# Patient Record
Sex: MALE | Race: WHITE | NOT HISPANIC OR LATINO | Employment: FULL TIME | URBAN - METROPOLITAN AREA
[De-identification: names, ages, dates, MRNs, and addresses within clinical notes are randomized per-mention and may not be internally consistent; named-entity substitution may affect disease eponyms.]

---

## 2019-07-20 ENCOUNTER — HOSPITAL ENCOUNTER (EMERGENCY)
Facility: HOSPITAL | Age: 44
Discharge: HOME | End: 2019-07-20
Attending: EMERGENCY MEDICINE | Admitting: EMERGENCY MEDICINE
Payer: COMMERCIAL

## 2019-07-20 VITALS
WEIGHT: 170 LBS | HEIGHT: 70 IN | OXYGEN SATURATION: 99 % | TEMPERATURE: 98.5 F | RESPIRATION RATE: 20 BRPM | BODY MASS INDEX: 24.34 KG/M2 | DIASTOLIC BLOOD PRESSURE: 95 MMHG | HEART RATE: 127 BPM | SYSTOLIC BLOOD PRESSURE: 169 MMHG

## 2019-07-20 DIAGNOSIS — L02.91 ABSCESS: Primary | ICD-10-CM

## 2019-07-20 PROCEDURE — 87185 SC STD ENZYME DETCJ PER NZM: CPT | Performed by: EMERGENCY MEDICINE

## 2019-07-20 PROCEDURE — 87076 CULTURE ANAEROBE IDENT EACH: CPT | Performed by: EMERGENCY MEDICINE

## 2019-07-20 PROCEDURE — 10061 I&D ABSCESS COMP/MULTIPLE: CPT | Performed by: EMERGENCY MEDICINE

## 2019-07-20 PROCEDURE — 87147 CULTURE TYPE IMMUNOLOGIC: CPT | Performed by: EMERGENCY MEDICINE

## 2019-07-20 PROCEDURE — 87070 CULTURE OTHR SPECIMN AEROBIC: CPT | Performed by: EMERGENCY MEDICINE

## 2019-07-20 PROCEDURE — 87205 SMEAR GRAM STAIN: CPT | Performed by: EMERGENCY MEDICINE

## 2019-07-20 PROCEDURE — 99283 EMERGENCY DEPT VISIT LOW MDM: CPT

## 2019-07-20 PROCEDURE — 99283 EMERGENCY DEPT VISIT LOW MDM: CPT | Performed by: EMERGENCY MEDICINE

## 2019-07-20 RX ORDER — CLINDAMYCIN HYDROCHLORIDE 150 MG/1
150 CAPSULE ORAL EVERY 6 HOURS
Qty: 40 CAPSULE | Refills: 0 | Status: SHIPPED | OUTPATIENT
Start: 2019-07-20 | End: 2019-07-30

## 2019-07-20 RX ORDER — CLINDAMYCIN HYDROCHLORIDE 150 MG/1
150 CAPSULE ORAL EVERY 6 HOURS
Qty: 40 CAPSULE | Refills: 0 | Status: SHIPPED | OUTPATIENT
Start: 2019-07-20 | End: 2019-07-20

## 2019-07-20 RX ORDER — LIDOCAINE HYDROCHLORIDE 10 MG/ML
10 INJECTION, SOLUTION EPIDURAL; INFILTRATION; INTRACAUDAL; PERINEURAL ONCE
Status: COMPLETED | OUTPATIENT
Start: 2019-07-20 | End: 2019-07-20

## 2019-07-20 RX ADMIN — LIDOCAINE HYDROCHLORIDE 10 ML: 10 INJECTION, SOLUTION EPIDURAL; INFILTRATION; INTRACAUDAL; PERINEURAL at 13:22

## 2019-07-20 NOTE — ED NOTES
Moderate amount of blood and white colored purulent drainage from scrotal incision        Kay Estrada RN  07/20/19 7607

## 2019-07-20 NOTE — ED PROVIDER NOTES
History  Chief Complaint   Patient presents with    Cyst     patient presents to the ED with c/o a cyst on his scrotum  patient states it has been there since wednesday and has gotten progressively worse      Patient complains of left scrotal lump gradual worsening over last 3 days without injury  He has had similar lumps that he drained on his own at his [facility] in the past   He denies fever had chills but attributed that to air conditioning  Patient has pain at area of lump  Patient attributes his elevated HR to anxiety  He states that he is just pre-diabetic and controls his diet  None       Past Medical History:   Diagnosis Date    Diabetes mellitus ([facility] [address] )     prediabetic     Psoriasis        History reviewed  No pertinent surgical history  History reviewed  No pertinent family history  I have reviewed and agree with the history as documented  Social History     Tobacco Use    Smoking status: Current Every Day Smoker     Packs/day: 0 50     Types: Cigarettes    Smokeless tobacco: Never Used   Substance Use Topics    Alcohol use: Yes     Comment: rarely     Drug use: Never        Review of Systems   All other systems reviewed and are negative  Physical Exam  Physical Exam   Constitutional: He is oriented to person, place, and time  He appears well-developed and well-nourished  HENT:   Mouth/Throat: Oropharynx is clear and moist    Eyes: Pupils are equal, round, and reactive to light  Conjunctivae and EOM are normal    Neck: Normal range of motion  Neck supple  No spinous process tenderness present  Cardiovascular: Normal rate, regular rhythm, normal heart sounds and intact distal pulses  Pulmonary/Chest: Effort normal and breath sounds normal  No respiratory distress  He has no wheezes  Abdominal: Soft  Bowel sounds are normal  He exhibits no distension  There is no tenderness   Hernia confirmed negative in the right inguinal area and confirmed negative in the left inguinal area  Genitourinary: Penis normal  Left testis shows mass and swelling  Circumcised  No penile tenderness  Genitourinary Comments: Testicles non-tender no edema bilateral but just inferior to left testicle in scrotum is 5 cm fluctuant mobile mass with punctate pustule at surface but no drainage  Small amount of erythema about 3 cm surrounding the pustule  No erythema, mass or tenderness around heather-anal region   Musculoskeletal: Normal range of motion  Neurological: He is alert and oriented to person, place, and time  He has normal strength  No sensory deficit  GCS eye subscore is 4  GCS verbal subscore is 5  GCS motor subscore is 6  Skin: Skin is warm and dry  No rash noted  Psychiatric: He has a normal mood and affect  Nursing note and vitals reviewed  Vital Signs  ED Triage Vitals [07/20/19 1303]   Temperature Pulse Respirations Blood Pressure SpO2   98 5 °F (36 9 °C) (!) 127 20 169/95 99 %      Temp Source Heart Rate Source Patient Position - Orthostatic VS BP Location FiO2 (%)   Temporal Monitor Sitting Left arm --      Pain Score       5           Vitals:    07/20/19 1303   BP: 169/95   Pulse: (!) 127   Patient Position - Orthostatic VS: Sitting         Visual Acuity  Visual Acuity      Most Recent Value   L Pupil Size (mm)  3   R Pupil Size (mm)  3          ED Medications  Medications   lidocaine (PF) (XYLOCAINE-MPF) 1 % injection 10 mL (10 mL Infiltration Given 7/20/19 1322)       Diagnostic Studies  Results Reviewed     Procedure Component Value Units Date/Time    Wound culture and Gram stain [[id number]] Collected:  07/20/19 1427    Lab Status:   In process Specimen:  Wound from Genital Updated:  07/20/19 1449                 No orders to display              Procedures  Incision and drain  Date/Time: 7/20/2019 2:31 PM  Performed by: Effie Smith DO  Authorized by: Effie Smith DO     Patient location:  ED  Consent:     Consent obtained:  Verbal    Consent given by:  Patient Risks discussed:  Bleeding, incomplete drainage, pain and infection    Alternatives discussed:  No treatment  Universal protocol:     Procedure explained and questions answered to patient or proxy's satisfaction: yes      Patient identity confirmed:  Verbally with patient  Location:     Type:  Abscess    Size:  5 cm    Location:  Anogenital    Anogenital location:  Scrotal space  Pre-procedure details:     Skin preparation:  Chloraprep  Anesthesia (see MAR for exact dosages): Anesthesia method:  Local infiltration    Local anesthetic:  Lidocaine 1% w/o epi  Procedure details:     Complexity:  Simple    Incision types:  Stab incision    Scalpel blade:  10    Approach:  Open    Incision depth:  Subcutaneous    Wound management:  Probed and deloculated, irrigated with saline and extensive cleaning    Drainage:  Bloody and purulent    Drainage amount: Moderate    Wound treatment:  Packing placed    Packing materials:  1/2 in gauze    Amount 1/2":  2 different pockets of fluid were lanced and packed, each with 3 cm of packing  Post-procedure details:     Patient tolerance of procedure:   Tolerated well, no immediate complications  Comments:      Bedside ultrasound machine was used to locate fluid collections           ED Course       due to complexity of location and packing, patient was informed to return to ER for reassessment and started on clindamycin                        MDM  Number of Diagnoses or Management Options  Abscess: new and requires workup     Amount and/or Complexity of Data Reviewed  Clinical lab tests: ordered    Patient Progress  Patient progress: improved      Disposition  Final diagnoses:   Abscess - scrotal     Time reflects when diagnosis was documented in both MDM as applicable and the Disposition within this note     Time User Action Codes Description Comment    7/20/2019  2:15 PM Charlette Brar Add [L02 91] Abscess     7/20/2019  2:15 PM Charlette Brar Modify [L02 91] Abscess scrotal      ED Disposition     ED Disposition Condition Date/Time Comment    Discharge Stable Sat Jul 20, 2019  2:15  [address] discharge to home/self care  Follow-up Information     Follow up With Specialties Details Why Contact Info Additional Information    [address] [facility] Emergency Department Emergency Medicine In 2 days  [address]  [address]  [phone] [address] ED, [address], [address]          Discharge Medication List as of 7/20/2019  2:26 PM      START taking these medications    Details   clindamycin (CLEOCIN) 150 mg capsule Take 1 capsule (150 mg total) by mouth every 6 (six) hours for 10 days, Starting Sat 7/20/2019, Until Tue 7/30/2019, Normal           No discharge procedures on file      ED Provider  Electronically Signed by           Olya Peng DO  07/20/19 1934

## 2019-07-20 NOTE — ED NOTES
Second incision and drainage done by Dr Jon Gant above initial incision  Additional blood/purulent drainage, moderate amount of drainage noted  Patient reports relief with additional drainage   Wound cleansed, both incisions packed with one strand     Maria E Minaya RN  07/20/19 9552

## 2019-07-20 NOTE — ED NOTES
Provider and this RN at bedside for incision and drainage of scrotal abscess       Nikhil Domínguez, RN  07/20/19 4029

## 2019-07-23 LAB
BACTERIA WND AEROBE CULT: ABNORMAL
GRAM STN SPEC: ABNORMAL

## 2024-04-25 ENCOUNTER — APPOINTMENT (EMERGENCY)
Dept: ULTRASOUND IMAGING | Facility: HOSPITAL | Age: 49
End: 2024-04-25
Payer: COMMERCIAL

## 2024-04-25 ENCOUNTER — HOSPITAL ENCOUNTER (EMERGENCY)
Facility: HOSPITAL | Age: 49
End: 2024-04-26
Attending: EMERGENCY MEDICINE
Payer: COMMERCIAL

## 2024-04-25 DIAGNOSIS — A41.9 SEVERE SEPSIS (HCC): ICD-10-CM

## 2024-04-25 DIAGNOSIS — N49.2 SCROTAL ABSCESS: Primary | ICD-10-CM

## 2024-04-25 DIAGNOSIS — R65.20 SEVERE SEPSIS (HCC): ICD-10-CM

## 2024-04-25 LAB
ALBUMIN SERPL BCP-MCNC: 4.5 G/DL (ref 3.5–5)
ALP SERPL-CCNC: 116 U/L (ref 34–104)
ALT SERPL W P-5'-P-CCNC: 24 U/L (ref 7–52)
ANION GAP SERPL CALCULATED.3IONS-SCNC: 11 MMOL/L (ref 4–13)
APTT PPP: 24 SECONDS (ref 23–37)
AST SERPL W P-5'-P-CCNC: 12 U/L (ref 13–39)
BASOPHILS # BLD AUTO: 0.03 THOUSANDS/ÂΜL (ref 0–0.1)
BASOPHILS NFR BLD AUTO: 0 % (ref 0–1)
BILIRUB DIRECT SERPL-MCNC: 0.44 MG/DL (ref 0–0.2)
BILIRUB SERPL-MCNC: 2.55 MG/DL (ref 0.2–1)
BUN SERPL-MCNC: 12 MG/DL (ref 5–25)
CALCIUM SERPL-MCNC: 9.9 MG/DL (ref 8.4–10.2)
CHLORIDE SERPL-SCNC: 98 MMOL/L (ref 96–108)
CO2 SERPL-SCNC: 27 MMOL/L (ref 21–32)
CREAT SERPL-MCNC: 0.69 MG/DL (ref 0.6–1.3)
EOSINOPHIL # BLD AUTO: 0.03 THOUSAND/ÂΜL (ref 0–0.61)
EOSINOPHIL NFR BLD AUTO: 0 % (ref 0–6)
ERYTHROCYTE [DISTWIDTH] IN BLOOD BY AUTOMATED COUNT: 11.3 % (ref 11.6–15.1)
GFR SERPL CREATININE-BSD FRML MDRD: 111 ML/MIN/1.73SQ M
GLUCOSE SERPL-MCNC: 171 MG/DL (ref 65–140)
GLUCOSE SERPL-MCNC: 174 MG/DL (ref 65–140)
HCT VFR BLD AUTO: 49.3 % (ref 36.5–49.3)
HGB BLD-MCNC: 16.5 G/DL (ref 12–17)
IMM GRANULOCYTES # BLD AUTO: 0.1 THOUSAND/UL (ref 0–0.2)
IMM GRANULOCYTES NFR BLD AUTO: 1 % (ref 0–2)
INR PPP: 0.98 (ref 0.84–1.19)
LACTATE SERPL-SCNC: 1.2 MMOL/L (ref 0.5–2)
LYMPHOCYTES # BLD AUTO: 1.78 THOUSANDS/ÂΜL (ref 0.6–4.47)
LYMPHOCYTES NFR BLD AUTO: 12 % (ref 14–44)
MCH RBC QN AUTO: 29.2 PG (ref 26.8–34.3)
MCHC RBC AUTO-ENTMCNC: 33.5 G/DL (ref 31.4–37.4)
MCV RBC AUTO: 87 FL (ref 82–98)
MONOCYTES # BLD AUTO: 1.21 THOUSAND/ÂΜL (ref 0.17–1.22)
MONOCYTES NFR BLD AUTO: 8 % (ref 4–12)
NEUTROPHILS # BLD AUTO: 11.5 THOUSANDS/ÂΜL (ref 1.85–7.62)
NEUTS SEG NFR BLD AUTO: 79 % (ref 43–75)
NRBC BLD AUTO-RTO: 0 /100 WBCS
PLATELET # BLD AUTO: 215 THOUSANDS/UL (ref 149–390)
PMV BLD AUTO: 10.1 FL (ref 8.9–12.7)
POTASSIUM SERPL-SCNC: 3.9 MMOL/L (ref 3.5–5.3)
PROCALCITONIN SERPL-MCNC: 0.07 NG/ML
PROT SERPL-MCNC: 8 G/DL (ref 6.4–8.4)
PROTHROMBIN TIME: 13.4 SECONDS (ref 11.6–14.5)
RBC # BLD AUTO: 5.65 MILLION/UL (ref 3.88–5.62)
SODIUM SERPL-SCNC: 136 MMOL/L (ref 135–147)
WBC # BLD AUTO: 14.65 THOUSAND/UL (ref 4.31–10.16)

## 2024-04-25 PROCEDURE — 82948 REAGENT STRIP/BLOOD GLUCOSE: CPT

## 2024-04-25 PROCEDURE — 36415 COLL VENOUS BLD VENIPUNCTURE: CPT | Performed by: EMERGENCY MEDICINE

## 2024-04-25 PROCEDURE — 99285 EMERGENCY DEPT VISIT HI MDM: CPT | Performed by: EMERGENCY MEDICINE

## 2024-04-25 PROCEDURE — 96367 TX/PROPH/DG ADDL SEQ IV INF: CPT

## 2024-04-25 PROCEDURE — 82248 BILIRUBIN DIRECT: CPT | Performed by: EMERGENCY MEDICINE

## 2024-04-25 PROCEDURE — 85610 PROTHROMBIN TIME: CPT | Performed by: EMERGENCY MEDICINE

## 2024-04-25 PROCEDURE — 84145 PROCALCITONIN (PCT): CPT | Performed by: EMERGENCY MEDICINE

## 2024-04-25 PROCEDURE — 96361 HYDRATE IV INFUSION ADD-ON: CPT

## 2024-04-25 PROCEDURE — 85025 COMPLETE CBC W/AUTO DIFF WBC: CPT | Performed by: EMERGENCY MEDICINE

## 2024-04-25 PROCEDURE — 83036 HEMOGLOBIN GLYCOSYLATED A1C: CPT

## 2024-04-25 PROCEDURE — 85730 THROMBOPLASTIN TIME PARTIAL: CPT | Performed by: EMERGENCY MEDICINE

## 2024-04-25 PROCEDURE — 80053 COMPREHEN METABOLIC PANEL: CPT | Performed by: EMERGENCY MEDICINE

## 2024-04-25 PROCEDURE — 99284 EMERGENCY DEPT VISIT MOD MDM: CPT

## 2024-04-25 PROCEDURE — 87040 BLOOD CULTURE FOR BACTERIA: CPT | Performed by: EMERGENCY MEDICINE

## 2024-04-25 PROCEDURE — 93005 ELECTROCARDIOGRAM TRACING: CPT

## 2024-04-25 PROCEDURE — 96365 THER/PROPH/DIAG IV INF INIT: CPT

## 2024-04-25 PROCEDURE — 76870 US EXAM SCROTUM: CPT

## 2024-04-25 PROCEDURE — 83605 ASSAY OF LACTIC ACID: CPT | Performed by: EMERGENCY MEDICINE

## 2024-04-25 RX ORDER — CEFTRIAXONE 2 G/50ML
2000 INJECTION, SOLUTION INTRAVENOUS ONCE
Status: COMPLETED | OUTPATIENT
Start: 2024-04-25 | End: 2024-04-25

## 2024-04-25 RX ORDER — VANCOMYCIN HYDROCHLORIDE 1 G/200ML
15 INJECTION, SOLUTION INTRAVENOUS ONCE
Status: COMPLETED | OUTPATIENT
Start: 2024-04-25 | End: 2024-04-25

## 2024-04-25 RX ORDER — ACETAMINOPHEN 325 MG/1
650 TABLET ORAL ONCE
Status: COMPLETED | OUTPATIENT
Start: 2024-04-25 | End: 2024-04-25

## 2024-04-25 RX ADMIN — SODIUM CHLORIDE 1000 ML: 0.9 INJECTION, SOLUTION INTRAVENOUS at 17:15

## 2024-04-25 RX ADMIN — VANCOMYCIN HYDROCHLORIDE 1000 MG: 1 INJECTION, SOLUTION INTRAVENOUS at 18:05

## 2024-04-25 RX ADMIN — ACETAMINOPHEN 650 MG: 325 TABLET, FILM COATED ORAL at 22:25

## 2024-04-25 RX ADMIN — CEFTRIAXONE 2000 MG: 2 INJECTION, SOLUTION INTRAVENOUS at 17:48

## 2024-04-25 NOTE — SEPSIS NOTE
"  Sepsis Note   Demetri Miller 48 y.o. male MRN: [id number]  Unit/Bed#: ED 10 Encounter: [id number]       Initial Sepsis Screening       Row Name 04/25/24 1758 04/25/24 1744             Is the patient's history suggestive of a new or worsening infection? -- Yes (Proceed)  -JAUN       Suspected source of infection -- soft tissue  -JAUN       Indicate SIRS criteria -- Tachycardia > 90 bpm;Leukocytosis (WBC > 78596 IJL) OR Leukopenia (WBC <4000 IJL) OR Bandemia (WBC >10% bands)  -JUAN       Are two or more of the above signs & symptoms of infection both present and new to the patient? -- Yes (Proceed)  -JUAN       Assess for evidence of organ dysfunction: Are any of the below criteria present within 6 hours of suspected infection and SIRS criteria that are NOT considered to be chronic conditions? Bilirubin > 2.0  -JUAN --       Date of presentation of severe sepsis 04/25/24  -JUAN --       Time of presentation of severe sepsis 1759  -JUAN --       Sepsis Note: Click \"NEXT\" below (NOT \"close\") to generate sepsis note based on above information. -- --                 User Key  (r) = Recorded By, (t) = Taken By, (c) = Cosigned By      Initials Name Provider Type    JUAN Peraza DO Physician                        Body mass index is 22.96 kg/m².  Wt Readings from Last 1 Encounters:   04/25/24 72.6 kg (160 lb)        Ideal body weight: 73 kg (160 lb 15 oz)    "

## 2024-04-25 NOTE — ED PROVIDER NOTES
History  Chief Complaint   Patient presents with    Cyst     Pt said that Monday he got a cyst on his right testicle, pt said that it has gotten worse with redness and swelling. Pt denies pain with urination.      History from patient and medical records, past medical history prediabetic and psoriasis.  Patient has had an abscess or lump at his right scrotal area for about a month now.  It was small he was probably initially trying to treat it with warm soaks in the bathtub but it has gotten larger and more painful over the last several days.  He has associated fevers.  At 1 point patient was able to express purulent discharge but at this point it is not draining.        None       Past Medical History:   Diagnosis Date    Diabetes mellitus (HCC)     prediabetic     Psoriasis        History reviewed. No pertinent surgical history.    History reviewed. No pertinent family history.  I have reviewed and agree with the history as documented.    E-Cigarette/Vaping     E-Cigarette/Vaping Substances     Social History     Tobacco Use    Smoking status: Every Day     Current packs/day: 0.50     Types: Cigarettes    Smokeless tobacco: Never   Substance Use Topics    Alcohol use: Yes     Comment: rarely     Drug use: Never       Review of Systems   Constitutional:  Positive for fever. Negative for chills.   HENT:  Negative for ear pain and sore throat.    Eyes:  Negative for pain and visual disturbance.   Respiratory:  Negative for cough and shortness of breath.    Cardiovascular:  Negative for chest pain and palpitations.   Gastrointestinal:  Negative for abdominal pain and vomiting.   Genitourinary:  Positive for scrotal swelling and testicular pain. Negative for dysuria and hematuria.   Musculoskeletal:  Negative for arthralgias and back pain.   Skin:  Positive for rash and wound. Negative for color change.   Neurological:  Negative for seizures and syncope.   All other systems reviewed and are negative.      Physical  Exam  Physical Exam  Vitals and nursing note reviewed. Exam conducted with a chaperone present.   Constitutional:       General: He is not in acute distress.     Appearance: He is well-developed.   HENT:      Head: Normocephalic and atraumatic.   Eyes:      Conjunctiva/sclera: Conjunctivae normal.   Cardiovascular:      Rate and Rhythm: Regular rhythm. Tachycardia present.      Heart sounds: No murmur heard.  Pulmonary:      Effort: Pulmonary effort is normal. No respiratory distress.      Breath sounds: Normal breath sounds.   Abdominal:      Palpations: Abdomen is soft.      Tenderness: There is no abdominal tenderness.   Genitourinary:     Pubic Area: Rash present.      Penis: Normal and circumcised.       Testes:         Right: Mass, tenderness and swelling present.      Comments: Erythema scaly, rash at inguinal creases consistent with psoriasis.  No crepitus    Right scrotal mass immobile firm about 6 cm oval shape, tender with erythema, appears separate from testicle  Musculoskeletal:         General: No swelling.      Cervical back: Neck supple.   Skin:     General: Skin is warm and dry.      Capillary Refill: Capillary refill takes less than 2 seconds.   Neurological:      Mental Status: He is alert.   Psychiatric:         Mood and Affect: Mood normal.         Vital Signs  ED Triage Vitals   Temperature Pulse Respirations Blood Pressure SpO2   04/25/24 1701 04/25/24 1635 04/25/24 1635 04/25/24 1636 04/25/24 1635   98.1 °F (36.7 °C) (!) 115 18 159/74 98 %      Temp Source Heart Rate Source Patient Position - Orthostatic VS BP Location FiO2 (%)   04/25/24 1701 04/25/24 1800 04/25/24 1800 04/25/24 1800 --   Oral Monitor Sitting Right arm       Pain Score       04/25/24 1930       No Pain           Vitals:    04/25/24 1900 04/25/24 1930 04/25/24 2000 04/25/24 2100   BP: 143/70 153/87 140/79 146/79   Pulse: 99 102 101 94   Patient Position - Orthostatic VS: Sitting Lying Lying          Visual Acuity      ED  Medications  Medications   sodium chloride 0.9 % bolus 1,000 mL (0 mL Intravenous Stopped 4/25/24 1815)   cefTRIAXone (ROCEPHIN) IVPB (premix in dextrose) 2,000 mg 50 mL (0 mg Intravenous Stopped 4/25/24 1818)   vancomycin (VANCOCIN) IVPB (premix in dextrose) 1,000 mg 200 mL (0 mg Intravenous Stopped 4/25/24 1905)       Diagnostic Studies  Results Reviewed       Procedure Component Value Units Date/Time    Blood culture #1 [[id number]] Collected: 04/25/24 1717    Lab Status: Preliminary result Specimen: Blood from Arm, Left Updated: 04/25/24 2101     Blood Culture Received in Microbiology Lab. Culture in Progress.    Blood culture #2 [[id number]] Collected: 04/25/24 1717    Lab Status: Preliminary result Specimen: Blood from Arm, Right Updated: 04/25/24 2101     Blood Culture Received in Microbiology Lab. Culture in Progress.    Bilirubin, direct [[id number]]  (Abnormal) Collected: 04/25/24 1717    Lab Status: Final result Specimen: Blood from Arm, Left Updated: 04/25/24 1803     Bilirubin, Direct 0.44 mg/dL     Procalcitonin [[id number]]  (Normal) Collected: 04/25/24 1717    Lab Status: Final result Specimen: Blood from Arm, Left Updated: 04/25/24 1755     Procalcitonin 0.07 ng/ml     Protime-INR [[id number]]  (Normal) Collected: 04/25/24 1717    Lab Status: Final result Specimen: Blood from Arm, Left Updated: 04/25/24 1747     Protime 13.4 seconds      INR 0.98    APTT [[id number]]  (Normal) Collected: 04/25/24 1717    Lab Status: Final result Specimen: Blood from Arm, Left Updated: 04/25/24 1747     PTT 24 seconds     Comprehensive metabolic panel [[id number]]  (Abnormal) Collected: 04/25/24 1717    Lab Status: Final result Specimen: Blood from Arm, Left Updated: 04/25/24 1744     Sodium 136 mmol/L      Potassium 3.9 mmol/L      Chloride 98 mmol/L      CO2 27 mmol/L      ANION GAP 11 mmol/L      BUN 12 mg/dL      Creatinine 0.69 mg/dL      Glucose 174 mg/dL      Calcium 9.9 mg/dL      AST 12 U/L      ALT 24 U/L       Alkaline Phosphatase 116 U/L      Total Protein 8.0 g/dL      Albumin 4.5 g/dL      Total Bilirubin 2.55 mg/dL      eGFR 111 ml/min/1.73sq m     Narrative:      National Kid[employer] guidelines for Chronic Kidney Disease (CKD):     Stage 1 with normal or high GFR (GFR > 90 mL/min/1.73 square meters)    Stage 2 Mild CKD (GFR = 60-89 mL/min/1.73 square meters)    Stage 3A Moderate CKD (GFR = 45-59 mL/min/1.73 square meters)    Stage 3B Moderate CKD (GFR = 30-44 mL/min/1.73 square meters)    Stage 4 Severe CKD (GFR = 15-29 mL/min/1.73 square meters)    Stage 5 End Stage CKD (GFR <15 mL/min/1.73 square meters)  Note: GFR calculation is accurate only with a steady state creatinine    Lactic acid [[id number]]  (Normal) Collected: 04/25/24 1717    Lab Status: Final result Specimen: Blood from Arm, Left Updated: 04/25/24 1744     LACTIC ACID 1.2 mmol/L     Narrative:      Result may be elevated if tourniquet was used during collection.    CBC and differential [[id number]]  (Abnormal) Collected: 04/25/24 1717    Lab Status: Final result Specimen: Blood from Arm, Left Updated: 04/25/24 1729     WBC 14.65 Thousand/uL      RBC 5.65 Million/uL      Hemoglobin 16.5 g/dL      Hematocrit 49.3 %      MCV 87 fL      MCH 29.2 pg      MCHC 33.5 g/dL      RDW 11.3 %      MPV 10.1 fL      Platelets 215 Thousands/uL      nRBC 0 /100 WBCs      Segmented % 79 %      Immature Grans % 1 %      Lymphocytes % 12 %      Monocytes % 8 %      Eosinophils Relative 0 %      Basophils Relative 0 %      Absolute Neutrophils 11.50 Thousands/µL      Absolute Immature Grans 0.10 Thousand/uL      Absolute Lymphocytes 1.78 Thousands/µL      Absolute Monocytes 1.21 Thousand/µL      Eosinophils Absolute 0.03 Thousand/µL      Basophils Absolute 0.03 Thousands/µL     Fingerstick Glucose (POCT) [[id number]]  (Abnormal) Collected: 04/25/24 1706    Lab Status: Final result Specimen: Blood Updated: 04/25/24 1707     POC Glucose 171 mg/dl                     US scrotum and testicles   Final Result by Tate Zimmer DO (04/25 6953)   Complex collection with peripheral vascularity corresponds to the palpable right side scrotal lump. Differential includes small abscess collection versus unlikely scrotal neoplasm. Reactive wall thickening right hemiscrotum. Urology consultation    recommended.      Both testes appear grossly unremarkable. Slightly increased peak systolic velocities on the right compared to the left testicle difficult to exclude mild right-sided orchitis.      Incidentally noted subcentimeter left epididymal head cyst or spermatocele.      The study was marked in EPIC for immediate notification.      Workstation performed: [id number]                    Procedures  ECG 12 Lead Documentation Only    Date/Time: 4/25/2024 5:56 PM    Performed by: Saulo Peraza DO  Authorized by: Saulo Peraza DO    Indications / Diagnosis:  Tachycardia  ECG reviewed by me, the ED Provider: yes    Patient location:  ED  Previous ECG:     Previous ECG:  Unavailable  Interpretation:     Interpretation: non-specific    Rate:     ECG rate:  112    ECG rate assessment: tachycardic    Rhythm:     Rhythm: sinus tachycardia    Ectopy:     Ectopy: none    QRS:     QRS axis:  Normal    QRS intervals:  Normal  Conduction:     Conduction: abnormal      Abnormal conduction: incomplete RBBB    ST segments:     ST segments:  Normal  T waves:     T waves: normal    Comments:      This EKG was interpreted by me.           ED Course  ED Course as of 04/25/24 2141   Thu Apr 25, 2024 1942 MIKI Camacho NP with urology is reviewing with Dr. Kate     s/o to [facility] - scrotal abscess/ cellulitis with severe sepsis - received IV abx iv fluids, urology in [place] - waiting to hear back to confirm transfer.  NPO for now                       Initial Sepsis Screening       Row Name 04/25/24 1758 04/25/24 5121             Is the patient's history suggestive of a new or worsening infection? -- Yes  "(Proceed)  -JUAN       Suspected source of infection -- soft tissue  -JUAN       Indicate SIRS criteria -- Tachycardia > 90 bpm;Leukocytosis (WBC > 27869 IJL) OR Leukopenia (WBC <4000 IJL) OR Bandemia (WBC >10% bands)  -JUNA       Are two or more of the above signs & symptoms of infection both present and new to the patient? -- Yes (Proceed)  -JUAN       Assess for evidence of organ dysfunction: Are any of the below criteria present within 6 hours of suspected infection and SIRS criteria that are NOT considered to be chronic conditions? Bilirubin > 2.0  -JUAN --       Date of presentation of severe sepsis 04/25/24  -JUAN --       Time of presentation of severe sepsis 1759  -JUAN --       Sepsis Note: Click \"NEXT\" below (NOT \"close\") to generate sepsis note based on above information. -- --                 User Key  (r) = Recorded By, (t) = Taken By, (c) = Cosigned By      Initials Name Provider Type    JUAN Peraza DO Physician                  Default Flowsheet Data (last 720 hours)       Sepsis Reassess       Row Name 04/25/24 1839                   Repeat Volume Status and Tissue Perfusion Assessment Performed    Date of Reassessment: 04/25/24  -JUAN        Time of Reassessment: 1840  -JUAN        Sepsis Reassessment Note: Click \"NEXT\" below (NOT \"close\") to generate sepsis reassessment note. YES (proceed by clicking \"NEXT\")  HR improved, patient has to urinate currently  -JUAN        Repeat Volume Status and Tissue Perfusion Assessment Performed --                  User Key  (r) = Recorded By, (t) = Taken By, (c) = Cosigned By      Initials Name Provider Type    JUAN Peraza DO Physician                                Medical Decision Making  Differential includes right scrotal abscess, consider cellulitis less likely torsion or cyst,.  Patient tachycardic, less likely septic with good BP, no fever in ER.  Patient also anxious.  Other diff will eval for hyperglycemia possibly contributing to wound healing    Amount and/or " Complexity of Data Reviewed  Labs: ordered.  Radiology: ordered.    Risk  Prescription drug management.             Disposition  Final diagnoses:   Scrotal abscess - acute right side with surrounding cellulitis   Severe sepsis (HCC)     Time reflects when diagnosis was documented in both MDM as applicable and the Disposition within this note       Time User Action Codes Description Comment    4/25/2024  6:01 PM Saulo Peraza [N49.2] Scrotal abscess     4/25/2024  6:01 PM Saulo Peraza [N49.2] Scrotal abscess acute right side with surrounding cellulitis    4/25/2024  6:01 PM Saulo Peraza [A41.9,  R65.20] Severe sepsis (HCC)           ED Disposition       ED Disposition   Transfer to Another Facility-In Network    Condition   --    Date/Time   Thu Apr 25, 2024  8:35 PM    Comment   Demetri Miller should be transferred out               Follow-up Information    None         Patient's Medications    No medications on file       No discharge procedures on file.    PDMP Review       None            ED Provider  Electronically Signed by             Saulo Peraza DO  04/25/24 4293

## 2024-04-25 NOTE — SEPSIS NOTE
Sepsis Note   Demetri Miller 48 y.o. male MRN: [id number]  Unit/Bed#: ED 10 Encounter: [id number]       Initial Sepsis Screening       Row Name 04/25/24 1744                Is the patient's history suggestive of a new or worsening infection? Yes (Proceed)  -JUAN        Suspected source of infection soft tissue  -JUAN        Indicate SIRS criteria Tachycardia > 90 bpm;Leukocytosis (WBC > 62015 IJL) OR Leukopenia (WBC <4000 IJL) OR Bandemia (WBC >10% bands)  -JUAN        Are two or more of the above signs & symptoms of infection both present and new to the patient? Yes (Proceed)  -JUAN        Assess for evidence of organ dysfunction: Are any of the below criteria present within 6 hours of suspected infection and SIRS criteria that are NOT considered to be chronic conditions? --                  User Key  (r) = Recorded By, (t) = Taken By, (c) = Cosigned By      Initials Name Provider Type    JUAN Saulo Peraza DO Physician                        Body mass index is 22.96 kg/m².  Wt Readings from Last 1 Encounters:   04/25/24 72.6 kg (160 lb)        Ideal body weight: 73 kg (160 lb 15 oz)

## 2024-04-25 NOTE — TREATMENT PLAN
Received TT from emergency department about patient who presented with a testicular lump x 1 month.  This has been worsening per reports.    WBC 14.65  Procalcitonin and lactic acid normal  Afebrile on arrival  Initially tachycardic at 115, current heart rate 99  Blood pressure stable 143/70  Patient received 2 g of Rocephin as well as vancomycin in the emergency department    Ultrasound    MPRESSION:  Complex collection with peripheral vascularity corresponds to the palpable right side scrotal lump. Differential includes small abscess collection versus unlikely scrotal neoplasm. Reactive wall thickening right hemiscrotum. Urology consultation   recommended.     Both testes appear grossly unremarkable. Slightly increased peak systolic velocities on the right compared to the left testicle difficult to exclude mild right-sided orchitis.     Incidentally noted subcentimeter left epididymal head cyst or spermatocele.       Plan:  Keep n.p.o. for now (last ate food at 10 AM last drank something 4:30 PM)  Fluids and medical optimization per emergency room team  Will discuss with Dr. Kate for further recommendations

## 2024-04-25 NOTE — SEPSIS NOTE
"  Sepsis Note   Demetri Miller 48 y.o. male MRN: [id number]  Unit/Bed#: ED 10 Encounter: [id number]       Initial Sepsis Screening       Row Name 04/25/24 1758 04/25/24 1744             Is the patient's history suggestive of a new or worsening infection? -- Yes (Proceed)  -JUAN       Suspected source of infection -- soft tissue  -JUAN       Indicate SIRS criteria -- Tachycardia > 90 bpm;Leukocytosis (WBC > 93169 IJL) OR Leukopenia (WBC <4000 IJL) OR Bandemia (WBC >10% bands)  -JUAN       Are two or more of the above signs & symptoms of infection both present and new to the patient? -- Yes (Proceed)  -JUAN       Assess for evidence of organ dysfunction: Are any of the below criteria present within 6 hours of suspected infection and SIRS criteria that are NOT considered to be chronic conditions? Bilirubin > 2.0  -JUAN --       Date of presentation of severe sepsis 04/25/24  -JUAN --       Time of presentation of severe sepsis 1759  -JUAN --       Sepsis Note: Click \"NEXT\" below (NOT \"close\") to generate sepsis note based on above information. -- --                 User Key  (r) = Recorded By, (t) = Taken By, (c) = Cosigned By      Initials Name Provider Type    JUAN Peraza DO Physician                    Default Flowsheet Data (last 720 hours)       Sepsis Reassess       Row Name 04/25/24 1839                   Repeat Volume Status and Tissue Perfusion Assessment Performed    Date of Reassessment: 04/25/24  -JUAN        Time of Reassessment: 1840  -JUAN        Sepsis Reassessment Note: Click \"NEXT\" below (NOT \"close\") to generate sepsis reassessment note. YES (proceed by clicking \"NEXT\")  HR improved, patient has to urinate currently  -JUAN        Repeat Volume Status and Tissue Perfusion Assessment Performed --                  User Key  (r) = Recorded By, (t) = Taken By, (c) = Cosigned By      Initials Name Provider Type    JUAN Peraza DO Physician                    Body mass index is 22.96 kg/m².  Wt Readings from Last 1 " Encounters:   04/25/24 72.6 kg (160 lb)        Ideal body weight: 73 kg (160 lb 15 oz)

## 2024-04-26 ENCOUNTER — HOSPITAL ENCOUNTER (INPATIENT)
Facility: HOSPITAL | Age: 49
LOS: 1 days | Discharge: HOME | DRG: 717 | End: 2024-04-27
Attending: UROLOGY | Admitting: INTERNAL MEDICINE
Payer: COMMERCIAL

## 2024-04-26 VITALS
WEIGHT: 160 LBS | SYSTOLIC BLOOD PRESSURE: 149 MMHG | RESPIRATION RATE: 17 BRPM | TEMPERATURE: 98 F | BODY MASS INDEX: 22.96 KG/M2 | OXYGEN SATURATION: 94 % | DIASTOLIC BLOOD PRESSURE: 75 MMHG | HEART RATE: 91 BPM

## 2024-04-26 DIAGNOSIS — N50.89 SCROTAL MASS: Primary | ICD-10-CM

## 2024-04-26 PROBLEM — R73.03 PRE-DIABETES: Status: ACTIVE | Noted: 2024-04-26

## 2024-04-26 PROBLEM — R65.10 SIRS (SYSTEMIC INFLAMMATORY RESPONSE SYNDROME) (HCC): Status: ACTIVE | Noted: 2024-04-26

## 2024-04-26 PROBLEM — L40.9 PSORIASIS: Status: ACTIVE | Noted: 2024-04-26

## 2024-04-26 LAB
GLUCOSE SERPL-MCNC: 119 MG/DL (ref 65–140)
GLUCOSE SERPL-MCNC: 212 MG/DL (ref 65–140)

## 2024-04-26 PROCEDURE — 82948 REAGENT STRIP/BLOOD GLUCOSE: CPT

## 2024-04-26 PROCEDURE — G0379 DIRECT REFER HOSPITAL OBSERV: HCPCS

## 2024-04-26 PROCEDURE — 55100 DRAINAGE OF SCROTUM ABSCESS: CPT | Performed by: UROLOGY

## 2024-04-26 PROCEDURE — 96367 TX/PROPH/DG ADDL SEQ IV INF: CPT

## 2024-04-26 PROCEDURE — 0V950ZZ DRAINAGE OF SCROTUM, OPEN APPROACH: ICD-10-PCS | Performed by: INTERNAL MEDICINE

## 2024-04-26 PROCEDURE — 87147 CULTURE TYPE IMMUNOLOGIC: CPT | Performed by: NURSE PRACTITIONER

## 2024-04-26 PROCEDURE — 87070 CULTURE OTHR SPECIMN AEROBIC: CPT | Performed by: NURSE PRACTITIONER

## 2024-04-26 PROCEDURE — NC001 PR NO CHARGE: Performed by: UROLOGY

## 2024-04-26 PROCEDURE — 87205 SMEAR GRAM STAIN: CPT | Performed by: NURSE PRACTITIONER

## 2024-04-26 PROCEDURE — 99222 1ST HOSP IP/OBS MODERATE 55: CPT | Performed by: UROLOGY

## 2024-04-26 PROCEDURE — 96366 THER/PROPH/DIAG IV INF ADDON: CPT

## 2024-04-26 RX ORDER — ACETAMINOPHEN 325 MG/1
975 TABLET ORAL EVERY 6 HOURS PRN
Status: DISCONTINUED | OUTPATIENT
Start: 2024-04-26 | End: 2024-04-27 | Stop reason: HOSPADM

## 2024-04-26 RX ORDER — CEFTRIAXONE 2 G/50ML
2000 INJECTION, SOLUTION INTRAVENOUS EVERY 24 HOURS
Status: DISCONTINUED | OUTPATIENT
Start: 2024-04-26 | End: 2024-04-26 | Stop reason: HOSPADM

## 2024-04-26 RX ORDER — OXYCODONE HYDROCHLORIDE 5 MG/1
5 TABLET ORAL EVERY 4 HOURS PRN
Status: DISCONTINUED | OUTPATIENT
Start: 2024-04-26 | End: 2024-04-27 | Stop reason: HOSPADM

## 2024-04-26 RX ORDER — LORAZEPAM 2 MG/ML
1 INJECTION INTRAMUSCULAR ONCE
Status: COMPLETED | OUTPATIENT
Start: 2024-04-26 | End: 2024-04-26

## 2024-04-26 RX ORDER — SULFAMETHOXAZOLE AND TRIMETHOPRIM 400; 80 MG/1; MG/1
2 TABLET ORAL EVERY 12 HOURS SCHEDULED
Status: DISCONTINUED | OUTPATIENT
Start: 2024-04-26 | End: 2024-04-27 | Stop reason: HOSPADM

## 2024-04-26 RX ORDER — INSULIN LISPRO 100 [IU]/ML
1-5 INJECTION, SOLUTION INTRAVENOUS; SUBCUTANEOUS
Status: DISCONTINUED | OUTPATIENT
Start: 2024-04-26 | End: 2024-04-27 | Stop reason: HOSPADM

## 2024-04-26 RX ORDER — SODIUM CHLORIDE 9 MG/ML
100 INJECTION, SOLUTION INTRAVENOUS CONTINUOUS
Status: DISCONTINUED | OUTPATIENT
Start: 2024-04-26 | End: 2024-04-26 | Stop reason: HOSPADM

## 2024-04-26 RX ORDER — LIDOCAINE HYDROCHLORIDE 20 MG/ML
10 INJECTION, SOLUTION EPIDURAL; INFILTRATION; INTRACAUDAL; PERINEURAL ONCE
Status: COMPLETED | OUTPATIENT
Start: 2024-04-26 | End: 2024-04-26

## 2024-04-26 RX ORDER — LIDOCAINE 40 MG/G
CREAM TOPICAL ONCE
Qty: 5 G | Refills: 0 | Status: COMPLETED | OUTPATIENT
Start: 2024-04-26 | End: 2024-04-26

## 2024-04-26 RX ADMIN — LIDOCAINE 4%: 4 CREAM TOPICAL at 16:55

## 2024-04-26 RX ADMIN — LORAZEPAM 1 MG: 2 INJECTION INTRAMUSCULAR; INTRAVENOUS at 16:26

## 2024-04-26 RX ADMIN — SODIUM CHLORIDE 100 ML/HR: 0.9 INJECTION, SOLUTION INTRAVENOUS at 11:04

## 2024-04-26 RX ADMIN — VANCOMYCIN HYDROCHLORIDE 1500 MG: 1 INJECTION, POWDER, LYOPHILIZED, FOR SOLUTION INTRAVENOUS at 11:15

## 2024-04-26 RX ADMIN — SULFAMETHOXAZOLE AND TRIMETHOPRIM 2 TABLET: 400; 80 TABLET ORAL at 21:10

## 2024-04-26 RX ADMIN — LIDOCAINE HYDROCHLORIDE 10 ML: 20 INJECTION, SOLUTION EPIDURAL; INFILTRATION; INTRACAUDAL; PERINEURAL at 16:55

## 2024-04-26 RX ADMIN — INSULIN LISPRO 2 UNITS: 100 INJECTION, SOLUTION INTRAVENOUS; SUBCUTANEOUS at 21:15

## 2024-04-26 NOTE — ED CARE HANDOFF
Emergency Department Sign Out Note        Sign out and transfer of care from Dr Peraza. See Separate Emergency Department note.     The patient, Demetri Miller, was evaluated by the previous provider for scrotal abscess.    Workup Completed:  47 yo male who has had an abscess of right scrotal area for about a month now. It was small he was probably initially trying to treat it with warm soaks in the bathtub but it has gotten larger and more painful over the last several days. He has associated fevers. At 1 point patient was able to express purulent discharge but at this point it is not draining.     ED Course / Workup Pending (followup):  Awaiting call from urology to approve likely transfer.  Did not want pain meds, got Ceftriaxone and vanco.    2142 - I isaac texted the on call APC Mayda Palomo who will check on plan and get back to me.     Pt being transferred to [facility] - spoke with [facility] who accepts pt to Dr Castaneda service.    Final diagnoses:   Scrotal abscess - acute right side with surrounding cellulitis   Severe sepsis (HCC)                               ED Course as of 04/26/24 0257   Thu Apr 25, 2024 2212 Dr Castaneda accepts pt for transfer.  Patient is aware of being excepted as transfer and urology will be seeing him at [facility] likely tomorrow.  I did discuss with him pain control and he would like a dose of Toradol.  He needs to be n.p.o. after midnight.     Procedures  Medical Decision Making  Amount and/or Complexity of Data Reviewed  Labs: ordered.  Radiology: ordered.    Risk  OTC drugs.  Prescription drug management.            Disposition  Final diagnoses:   Scrotal abscess - acute right side with surrounding cellulitis   Severe sepsis (HCC)     Time reflects when diagnosis was documented in both MDM as applicable and the Disposition within this note       Time User Action Codes Description Comment    4/25/2024  6:01 PM Saulo Peraza Add [N49.2] Scrotal abscess     4/25/2024  6:01  PM Saulo Peraza Modify [N49.2] Scrotal abscess acute right side with surrounding cellulitis    4/25/2024  6:01 PM Saulo Peraza Add [A41.9,  R65.20] Severe sepsis (HCC)           ED Disposition       ED Disposition   Transfer to Another Facility-In Network    Condition   --    Date/Time   Thu Apr 25, 2024  8:35 PM    Comment   Demetri SUMMERS Bridannbiju should be transferred out               MD Documentation      Flowsheet Row Most Recent Value   Patient Condition The patient has been stabilized such that within reasonable medical probability, no material deterioration of the patient condition or the condition of the unborn child(khushi) is likely to result from the transfer   Reason for Transfer Level of Care needed not available at this facility   Benefits of Transfer Specialized equipment and/or services available at the receiving facility (Include comment)________________________  [urology - OR if needed]   Risks of Transfer Potential deterioration of medical condition   Accepting Physician Tonya   Accepting Facility Name, [facility]    (Name & Tel number) Kellie Gómez   Provider Certification General risk, such as traffic hazards, adverse weather conditions, rough terrain or turbulence, possible failure of equipment (including vehicle or aircraft), or consequences of actions of persons outside the control of the transport personnel          RN Documentation      Flowsheet Row Most Recent Value   Accepting Facility Name, [facility]    (Name & Tel number) Kellie   Level of Care Basic life support          Follow-up Information    None       Patient's Medications    No medications on file     No discharge procedures on file.       ED Provider  Electronically Signed by     Adri Gómez DO  04/26/24 0258

## 2024-04-26 NOTE — ED NOTES
number for report [phone], spoke with Rosalia, no further questions      Mariely Landis, BOBY  04/26/24 7813

## 2024-04-26 NOTE — ED CARE HANDOFF
Emergency Department Sign Out Note        Sign out and transfer of care from Dr. Gómez. See Separate Emergency Department note.     The patient, Demetri Miller, was evaluated by the previous provider for scrotal infection.                                    ED Course as of 04/26/24 1314   Fri Apr 26, 2024   1108 Patient pending transfer to OR for scrotal infection and has been NPO since midnight. I started NS infusion and maintenance and spoke with pharmacy in order to appropriately order his next antibiotics doses due to uncertainty around transfer time.   1312 Spoke with [facility]. Patient has a pending bed at this time awaiting for DC to be completed. As such, discussed with hospitalist, Dr. Mcclure and will hold on a consult since plan is for transfer sometime this afternoon.      Procedures  Medical Decision Making  Amount and/or Complexity of Data Reviewed  Labs: ordered.  Radiology: ordered.    Risk  OTC drugs.  Prescription drug management.            Disposition  Final diagnoses:   Scrotal abscess - acute right side with surrounding cellulitis   Severe sepsis (HCC)     Time reflects when diagnosis was documented in both MDM as applicable and the Disposition within this note       Time User Action Codes Description Comment    4/25/2024  6:01 PM Saulo Peraza Add [N49.2] Scrotal abscess     4/25/2024  6:01 PM Saulo Peraza Modify [N49.2] Scrotal abscess acute right side with surrounding cellulitis    4/25/2024  6:01 PM Saulo Peraza [A41.9,  R65.20] Severe sepsis (HCC)           ED Disposition       ED Disposition   Transfer to Another Facility-In Network    Condition   --    Date/Time   Thu Apr 25, 2024  8:35 PM    Comment   Demetri Miller should be transferred out               MD Documentation      Flowsheet Row Most Recent Value   Patient Condition The patient has been stabilized such that within reasonable medical probability, no material deterioration of the patient condition or the condition of the  unborn child(khushi) is likely to result from the transfer   Reason for Transfer Level of Care needed not available at this facility   Benefits of Transfer Specialized equipment and/or services available at the receiving facility (Include comment)________________________  [urology - OR if needed]   Risks of Transfer Potential deterioration of medical condition   Accepting Physician Tonya   Accepting Facility Name, [facility]    (Name & Tel number) Kellie   Darrick Gómez   Provider Certification General risk, such as traffic hazards, adverse weather conditions, rough terrain or turbulence, possible failure of equipment (including vehicle or aircraft), or consequences of actions of persons outside the control of the transport personnel          RN Documentation      Flowsheet Row Most Recent Value   Accepting Facility Name, [facility]    (Name & Tel number) Kellie   Level of Care Basic life support          Follow-up Information    None       Patient's Medications    No medications on file     No discharge procedures on file.       ED Provider  Electronically Signed by     Shena White DO  04/26/24 1218

## 2024-04-26 NOTE — ASSESSMENT & PLAN NOTE
Per patient does have history of prediabetes    SSI while inpatient.  Pending A1C  Follow up with PCP.

## 2024-04-26 NOTE — CONSULTS
ASSESSMENT:     48 y.o. male  with right dependent scrotal abscess    PLAN:     The patient has a very clear clinical abscess on exam with fluctuance at the dependent portion of the right hemiscrotum.  We discussed the options of operative I&D or an I&D at the bedside.  Given some of the current OR constraints, it is unclear when the patient will be able to proceed to the operating room and frankly he prefers to avoid anesthetic as this creates some anxiety for him.  He be very willing to again undergo a bedside incision and drainage with appropriate sedation and numbing.  Procedure was detailed for the patient and his sister including the need for packing after drainage.  Risks of bleeding and infection were reviewed.  After discussion, the patient agrees to proceed and signed informed consent.        ----          UROLOGY NEW CONSULT NOTE     CHIEF COMPLAINT   Demetri Miller is a 48 y.o. male with a complaint of No chief complaint on file.      History of Present Illness:   Demetri Miller is a 48 y.o. male transferred to [place] from the [facility] with concern for right scrotal swelling and pain.  Patient has a history of a contralateral left-sided abscess that was lanced at the bedside during a prior hospitalization.  Patient has been dealing with pain and discomfort since Monday but this began to [facility].  Was seen in the emergency room last evening and ultrasound performed.    Past Medical History:     Past Medical History:   Diagnosis Date    Diabetes mellitus (HCC)     prediabetic     Psoriasis        PAST SURGICAL HISTORY:   No past surgical history on file.    CURRENT MEDICATIONS:     Current Facility-Administered Medications   Medication Dose Route Frequency Provider Last Rate Last Admin    lidocaine (PF) (XYLOCAINE-MPF) 2 % injection 10 mL  10 mL Infiltration Once RENITA Mazariegos        LORazepam (ATIVAN) injection 1 mg  1 mg Intravenous Once RENITA Mazariegos            ALLERGIES:     Allergies   Allergen Reactions    Aspirin Anaphylaxis    Penicillins Anaphylaxis       SOCIAL HISTORY:     Social History     Socioeconomic History    Marital status:      Spouse name: Not on file    Number of children: Not on file    Years of education: Not on file    Highest education level: Not on file   Occupational History    Not on file   Tobacco Use    Smoking status: Every Day     Current packs/day: 0.50     Types: Cigarettes    Smokeless tobacco: Never   Substance and Sexual Activity    Alcohol use: Yes     Comment: rarely     Drug use: Never    Sexual activity: Not on file   Other Topics Concern    Not on file   Social History Narrative    Lives at home with family    Working full time     Social Determinants of Health     Financial Resource Strain: Not on file   Food Insecurity: Not on file   Transportation Needs: Not on file   Physical Activity: Not on file   Stress: Not on file   Social Connections: Not on file   Intimate Partner Violence: Not on file   Housing Stability: Not on file       SOCIAL HISTORY:   No family history on file.    REVIEW OF SYSTEMS:     Review of Systems   Constitutional:  Negative for chills and fever.   HENT:  Negative for ear pain and sore throat.    Eyes:  Negative for pain and visual disturbance.   Respiratory:  Negative for cough and shortness of breath.    Cardiovascular:  Negative for chest pain and palpitations.   Gastrointestinal:  Negative for abdominal pain and vomiting.   Genitourinary:  Positive for scrotal swelling. Negative for dysuria and hematuria.   Musculoskeletal:  Negative for arthralgias and back pain.   Skin:  Negative for color change and rash.   Neurological:  Negative for seizures and syncope.   All other systems reviewed and are negative.        PHYSICAL EXAM:     /91   Pulse (!) 107   Temp 98.6 °F (37 °C)   Resp 18   SpO2 97%     Physical Exam  Vitals reviewed.   Constitutional:       General: He is not in acute  distress.     Appearance: He is well-developed.   HENT:      Head: Normocephalic and atraumatic.   Eyes:      Pupils: Pupils are equal, round, and reactive to light.   Cardiovascular:      Rate and Rhythm: Normal rate.   Pulmonary:      Effort: Pulmonary effort is normal. No respiratory distress.      Breath sounds: Normal breath sounds.   Abdominal:      General: There is no distension.      Palpations: Abdomen is soft.      Tenderness: There is no abdominal tenderness.   Genitourinary:     Penis: Normal.       Comments: Some dependent swelling in the right hemiscrotum  Musculoskeletal:         General: Normal range of motion.      Cervical back: Normal range of motion and neck supple.   Skin:     General: Skin is warm and dry.   Neurological:      Mental Status: He is alert and oriented to person, place, and time.   Psychiatric:         Behavior: Behavior normal.         LABS:     CBC:   Lab Results   Component Value Date    WBC 14.65 (H) 04/25/2024    HGB 16.5 04/25/2024    HCT 49.3 04/25/2024    MCV 87 04/25/2024     04/25/2024       BMP:   Lab Results   Component Value Date    CALCIUM 9.9 04/25/2024    K 3.9 04/25/2024    CO2 27 04/25/2024    CL 98 04/25/2024    BUN 12 04/25/2024    CREATININE 0.69 04/25/2024         IMAGING:     Narrative & Impression   SCROTAL ULTRASOUND     INDICATION: right side swelling.     COMPARISON: None     TECHNIQUE: Ultrasound the scrotal contents was performed with a high frequency linear transducer utilizing volumetric sweep imaging as well as standard still image techniques. Imaging performed in longitudinal and transverse orientation. Color and   spectral Doppler evaluation also performed bilaterally.     FINDINGS:     TESTES:  Testes are symmetric and normal in size.     RIGHT testis = 4.3 x 2.2 x 2.1 cm. Volume 10.5 mL  Normal contour with homogeneous smooth echotexture.  No intratesticular mass lesion or calcifications.     LEFT testis = 4.0 x 2.0 x 2.4 cm.  Normal  contour with homogeneous smooth echotexture.  No intratesticular mass lesion or calcifications.     Normal Doppler flow within both testes is present. Peak systolic velocities in the right testicle up to 6 cm/s. Peak systolic velocities in the left testicle up to 5 cm/s.     EPIDIDYMIDES:  Right epididymal head measures 1.4 x 0.8 x 0.9 cm. Heterogeneous mass caudal to the right testicle in the area of palpable lump measuring 2.6 x 2.6 x 2.5 cm with increased peripheral vascularity.     Left-sided epididymal head measures 1.2 x 0.8 x 1.1 cm. Left epididymal head cyst or spermatocele measuring 7 mm.  Doppler ultrasound demonstrates normal blood flow.  No epididymal lesions.     HYDROCELE: No significant fluid present.     VARICOCELE: None present.     SCROTUM: Right scrotal wall thickness increased up to 7 mm.  Left-sided scrotal wall thickness normal at 2-3 mm.  No evidence for  hernia demonstrated.        IMPRESSION:  Complex collection with peripheral vascularity corresponds to the palpable right side scrotal lump. Differential includes small abscess collection versus unlikely scrotal neoplasm. Reactive wall thickening right hemiscrotum. Urology consultation   recommended.     Both testes appear grossly unremarkable. Slightly increased peak systolic velocities on the right compared to the left testicle difficult to exclude mild right-sided orchitis.     Incidentally noted subcentimeter left epididymal head cyst or spermatocele.

## 2024-04-26 NOTE — PROGRESS NOTES
Demetri Miller is a 48 y.o. male who is currently ordered Vancomycin IV with management by the Pharmacy Consult service.  Relevant clinical data and objective / subjective history reviewed.  Vancomycin Assessment:  Indication and Goal AUC/Trough: Other, Scrotal inection, -600, trough >10  Clinical Status: stable  Micro:     Renal Function:  SCr: 0.69 mg/dL  CrCl: 133 mL/min  Renal replacement: Not on dialysis  Days of Therapy: 2  Current Dose: Vancomycin 1000mg IV x1 given ED 4/25/24 1800  Vancomycin Plan:  New Dosing: Vancomycin 1500mg IV Q12H  Estimated AUC: 486 mcg*hr/mL  Estimated Trough: 10.8 mcg/mL  Next Level: Random Level AM labs 4/27/24  Renal Function Monitoring: Daily BMP and UOP  Pharmacy will continue to follow closely for s/sx of nephrotoxicity, infusion reactions and appropriateness of therapy.  BMP and CBC will be ordered per protocol. We will continue to follow the patient’s culture results and clinical progress daily.    Meir Torres, Pharmacist

## 2024-04-26 NOTE — ASSESSMENT & PLAN NOTE
Patient did meet SIRS criteria upon arrival being tachycardic and having elevated leukocytosis.  Lactic 1.2.  Pro-Colt 0.07.  Blood cultures drawn x 2 and pending  -Ultrasound did show findings that could be consistent with abscess    See A&P for scrotal mass.  Current stable vitals, feels well, appropriate for discharge.

## 2024-04-26 NOTE — H&P
"      INTERNAL MEDICINE RESIDENCY ADMISSION H&P     Name: Demetri Miller   Age & Sex: 48 y.o. male   MRN: [id number]  Unit/Bed#: [facility] 919-01   Encounter: [id number]  Primary Care Provider: No primary care provider on file.    Code Status: Level 1 - Full Code  Admission Status: INPATIENT   Disposition: Patient requires Med/Surg    Admit to team: SOD Team B     ASSESSMENT/PLAN     Principal Problem:    Scrotal mass  Active Problems:    SIRS (systemic inflammatory response syndrome) ([facility])    Pre-diabetes    Psoriasis      * Scrotal mass  Assessment & Plan  Notes cyst on right testicle since Monday that has gotten more erythematous and painful which prompted ED evaluation  -US in ED-\"Complex collection with peripheral vascularity corresponds to the palpable right side scrotal lump. Differential includes small abscess collection versus unlikely scrotal neoplasm. Reactive wall thickening right hemiscrotum. \" Pt transferred to hospitals, urology did bedside I&D and noted foul smelling purulent material    Urology consulted   Pt post bedside I&D  Cultures sent  Antibiotics per nephro: bactrim and follow up cultures   Pain regimen in place     SIRS (systemic inflammatory response syndrome) ([facility])  Assessment & Plan  Patient did meet SIRS criteria upon arrival being tachycardic and having elevated leukocytosis.  Lactic 1.2.  Pro-Colt 0.07.  Blood cultures drawn x 2 and pending  -Ultrasound did show findings that could be consistent with abscess    Will discuss with urology about antibiotics on presentation but for we will continue continue ceftriaxone 2 g every 24 hours  Redraw blood cultures x 2  Continue to monitor for signs of worsening infection      Psoriasis  Assessment & Plan  Known history of psoriasis.  Not currently on any treatment.    Pre-diabetes  Assessment & Plan  Per patient does have history of prediabetes    A1c  SSI  Carb controlled diet when not n.p.o.        VTE Pharmacologic Prophylaxis: Reason for no " "pharmacologic prophylaxis no indication  VTE Mechanical Prophylaxis: sequential compression device    CHIEF COMPLAINT   No chief complaint on file.     HISTORY OF PRESENT ILLNESS     Patient is a 48-year-old with only known past medical history of diabetes but does not actively see physicians.  Patient is presenting due to a cyst that he found on his right testicle on Monday that has progressively been getting more erythematous and more painful.  Patient presented to [facility] and ultrasound was done showing potential abscess versus unlikely neoplasm.  Recommendation was to be sent to [facility] for further urology evaluation.  At  UB patient presented afebrile, pulse 115, /74, 98% on room air.  Significant lab findings included WBC 14.65, creatinine 0.69, direct bilirubin 2.55 with repeat being 0.44, lactic 1.2, Pro-Colt 0.07, blood cultures x 2 pending.    Patient seen and examined today and states he still does have some discomfort in his scrotum.  No other complaints at this time.  REVIEW OF SYSTEMS     Review of Systems   Constitutional:  Negative for fatigue.   HENT: Negative.     Respiratory:  Negative for shortness of breath.    Cardiovascular:  Negative for chest pain, palpitations and leg swelling.   Gastrointestinal: Negative.    Genitourinary:  Positive for scrotal swelling and testicular pain.   Musculoskeletal: Negative.    Neurological: Negative.    Psychiatric/Behavioral: Negative.       OBJECTIVE     Vitals:    24 1523   BP: 149/91   Pulse: (!) 107   Resp: 18   Temp: 98.6 °F (37 °C)   TempSrc: Oral   SpO2: 97%   Weight: 72.8 kg (160 lb 7.9 oz)   Height: 5' 10\" (1.778 m)      Temperature:   Temp (24hrs), Av.3 °F (36.8 °C), Min:98 °F (36.7 °C), Max:98.6 °F (37 °C)    Temperature: 98.6 °F (37 °C)  Intake & Output:  I/O       None          Weights:   IBW (Ideal Body Weight): 73 kg    Body mass index is 23.03 kg/m².  Weight (last 2 days)       Date/Time Weight    " 04/26/24 15:23:08 72.8 (160.5)          Physical Exam  Vitals reviewed.   Constitutional:       Appearance: Normal appearance.   HENT:      Head: Normocephalic and atraumatic.      Mouth/Throat:      Mouth: Mucous membranes are moist.      Pharynx: Oropharynx is clear.   Eyes:      Pupils: Pupils are equal, round, and reactive to light.   Cardiovascular:      Rate and Rhythm: Normal rate and regular rhythm.      Pulses: Normal pulses.      Heart sounds: Normal heart sounds. No murmur heard.  Pulmonary:      Effort: Pulmonary effort is normal. No respiratory distress.      Breath sounds: Normal breath sounds.   Abdominal:      General: Abdomen is flat. Bowel sounds are normal.   Genitourinary:     Comments: Genital area covered with wound dressings post incision and drainage   Musculoskeletal:      Right lower leg: No edema.      Left lower leg: No edema.   Skin:     General: Skin is warm and dry.   Neurological:      Mental Status: He is alert and oriented to person, place, and time.   Psychiatric:         Mood and Affect: Mood normal.         Behavior: Behavior normal.       PAST MEDICAL HISTORY     Past Medical History:   Diagnosis Date    Diabetes mellitus (HCC)     prediabetic     Psoriasis      PAST SURGICAL HISTORY     Past Surgical History:   Procedure Laterality Date    INCISION AND DRAINAGE  4/26/2024     SOCIAL & FAMILY HISTORY     Social History     Substance and Sexual Activity   Alcohol Use Yes    Comment: rarely        Social History     Substance and Sexual Activity   Drug Use Never     Social History     Tobacco Use   Smoking Status Every Day    Current packs/day: 0.50    Types: Cigarettes   Smokeless Tobacco Never     History reviewed. No pertinent family history.  LABORATORY DATA     Labs: I have personally reviewed pertinent reports.    Results from last 7 days   Lab Units 04/25/24  1717   WBC Thousand/uL 14.65*   HEMOGLOBIN g/dL 16.5   HEMATOCRIT % 49.3   PLATELETS Thousands/uL 215   SEGS PCT %  79*   MONO PCT % 8   EOS PCT % 0      Results from last 7 days   Lab Units 04/25/24  1717   POTASSIUM mmol/L 3.9   CHLORIDE mmol/L 98   CO2 mmol/L 27   BUN mg/dL 12   CREATININE mg/dL 0.69   CALCIUM mg/dL 9.9   ALK PHOS U/L 116*   ALT U/L 24   AST U/L 12*              Results from last 7 days   Lab Units 04/25/24  1717   INR  0.98   PTT seconds 24     Results from last 7 days   Lab Units 04/25/24  1717   LACTIC ACID mmol/L 1.2         Micro:  Lab Results   Component Value Date    BLOODCX Received in Microbiology Lab. Culture in Progress. 04/25/2024    BLOODCX Received in Microbiology Lab. Culture in Progress. 04/25/2024    WOUNDCULT 2+ Growth of Beta Hemolytic Streptococcus Group B (A) 07/20/2019    WOUNDCULT Growth in Broth culture only Prevotella bivia (A) 07/20/2019    WOUNDCULT Few Colonies of 07/20/2019     IMAGING & DIAGNOSTIC TESTS     Imaging: I have personally reviewed pertinent reports.    US scrotum and testicles    Result Date: 4/25/2024  Impression: Complex collection with peripheral vascularity corresponds to the palpable right side scrotal lump. Differential includes small abscess collection versus unlikely scrotal neoplasm. Reactive wall thickening right hemiscrotum. Urology consultation recommended. Both testes appear grossly unremarkable. Slightly increased peak systolic velocities on the right compared to the left testicle difficult to exclude mild right-sided orchitis. Incidentally noted subcentimeter left epididymal head cyst or spermatocele. The study was marked in EPIC for immediate notification. Workstation performed: [id number]     EKG, Pathology, and Other Studies: I have personally reviewed pertinent reports.     ALLERGIES     Allergies   Allergen Reactions    Aspirin Anaphylaxis    Penicillins Anaphylaxis     MEDICATIONS PRIOR TO ARRIVAL     Prior to Admission medications    Not on File     MEDICATIONS ADMINISTERED IN LAST 24 HOURS     Medication Administration - last 24 hours from  "04/25/2024 1814 to 04/26/2024 1814         Date/Time Order Dose Route Action Action by     04/26/2024 1655 EDT lidocaine (PF) (XYLOCAINE-MPF) 2 % injection 10 mL 10 mL Infiltration Given Rosalia Marques RN     04/26/2024 1626 EDT LORazepam (ATIVAN) injection 1 mg 1 mg Intravenous Given Rosalia Marques RN     04/26/2024 1655 EDT lidocaine (LMX) 4 % cream -- Topical Given Rosalia Marques RN     04/26/2024 1720 EDT insulin lispro (HumALOG/ADMELOG) 100 units/mL subcutaneous injection 1-5 Units -- Subcutaneous Not Given Rosalia Marques RN          CURRENT MEDICATIONS     Current Facility-Administered Medications   Medication Dose Route Frequency Provider Last Rate    acetaminophen  975 mg Oral Q6H PRN Arias Norris MD      insulin lispro  1-5 Units Subcutaneous 4x Daily (AC & HS) Geovani Xiong MD      oxyCODONE  5 mg Oral Q4H PRN Arias Norris MD      oxyCODONE  2.5 mg Oral Q4H PRN Arias Norris MD      sulfamethoxazole-trimethoprim  2 tablet Oral Q12H AUSTYN Arias Norris MD            acetaminophen, 975 mg, Q6H PRN  oxyCODONE, 5 mg, Q4H PRN  oxyCODONE, 2.5 mg, Q4H PRN        Admission Time  I spent 30 minutes admitting the patient.  This involved direct patient contact where I performed a full history and physical, reviewing previous records, and reviewing laboratory and other diagnostic studies.    Portions of the record may have been created with voice recognition software.  Occasional wrong word or \"sound a like\" substitutions may have occurred due to the inherent limitations of voice recognition software.  Read the chart carefully and recognize, using context, where substitutions have occurred.    ==  Arias Norris MD  [facility]  Internal Medicine Residency PGY-2   "

## 2024-04-26 NOTE — QUICK NOTE
Asked to re-examine patient at bedside. Awaiting transfer to [facility] for right testicular abscess. No beds available.     Pt reports ongoing pain with movement. He also reports he had a similar episode 5-6 years ago.     VSS, afeb, no new labs    Exam: mild scaly erythema at groin creases due to psoriasis.   Right testicle is moderately swollen and tender. Small amount of ecchymosis noted at the dependent portion of the testicle. No skin breakdown noted.     Will report back to Urology team with findings.

## 2024-04-26 NOTE — ASSESSMENT & PLAN NOTE
"Notes cyst on right testicle since Monday that has gotten more erythematous and painful which prompted ED evaluation  -US in ED-\"Complex collection with peripheral vascularity corresponds to the palpable right side scrotal lump. Differential includes small abscess collection versus unlikely scrotal neoplasm. Reactive wall thickening right hemiscrotum. \" Pt transferred to [facility], urology did bedside I&D and noted foul smelling purulent material    Urology consulted   Pt post bedside I&D 4//26.  Doing well post procedure, no complaints this morning.  Wound culture noted for 3+ disintegrating polys, 2+ GPC, 2+ gram variable rods. Blood cultures negative at 24 hours.  On Bactrim DS 2 tablets Q12, day 2/7. Plan to discharge to complete regimen outpatient.  Will follow up with urology in 1 week for wound check.  Dressing changes twice daily, patient instructed.  Pain regimen in place   "

## 2024-04-26 NOTE — PROCEDURES
The patient was consented for the procedure.  After consent was signed, the patient was administered 1 mg of intravenous Ativan for local sedation.  4% LMX cream was placed over the scrotal skin and the right dependent scrotum and secured with a Tegaderm.    After approximately 5 minutes, the Tegaderm was removed and the LMX cream was removed from the skin.  The area was prepped with Betadine.  2% lidocaine was injected around the circumferential fluctuant area.  A #11 blade was then used to make a 2 cm longitudinal incision.  We immediately encountered foul-smelling purulence.  Culture that was obtained.    Approximately 50 to 100 cc of brown purulence was expressed.  The area was probed with a Q-tip.  Irrigated with sterile saline.  The remainder of the 10 cc of 2% lidocaine plain was instilled around and in the area.    Noniodoform packing was placed into the abscess cavity.  Gauze and an ABD pad were placed over top along with mesh panties.    Patient felt relief after the abscess was drained and tolerated the procedure well.    Plan will be for packing exchange in the next 24 hours

## 2024-04-26 NOTE — ED NOTES
1415 with SIMONA,  [facility] 919  Dr. Delgadillo accepting  number for report [phone]     Amanda Michaud RN  04/26/24 8927

## 2024-04-26 NOTE — EMTALA/ACUTE CARE TRANSFER
[facility] EMERGENCY DEPARTMENT  [address]  [facility] [address]  Dept: [phone]      EMTALA TRANSFER CONSENT    NAME Demetri Miller                                         1975                              MRN [id number]    I have been informed of my rights regarding examination, treatment, and transfer   by Dr. Adri Gómez DO    Benefits: Specialized equipment and/or services available at the receiving facility (Include comment)________________________ (urology - OR if needed)    Risks: Potential deterioration of medical condition      Consent for Transfer:  I acknowledge that my medical condition has been evaluated and explained to me by the emergency department physician or other qualified medical person and/or my attending physician, who has recommended that I be transferred to the service of  Accepting Physician: Tonya at Accepting Facility Name, City & State : [facility]. The above potential benefits of such transfer, the potential risks associated with such transfer, and the probable risks of not being transferred have been explained to me, and I fully understand them.  The doctor has explained that, in my case, the benefits of transfer outweigh the risks.  I agree to be transferred.    I authorize the performance of emergency medical procedures and treatments upon me in both transit and upon arrival at the receiving facility.  Additionally, I authorize the release of any and all medical records to the receiving facility and request they be transported with me, if possible.  I understand that the safest mode of transportation during a medical emergency is an ambulance and that the Hospital advocates the use of this mode of transport. Risks of traveling to the receiving facility by car, including absence of medical control, life sustaining equipment, such as oxygen, and medical personnel has been explained to me and I fully understand them.    (STEVE  CORRECT BOX BELOW)  [ x ]  I consent to the stated transfer and to be transported by ambulance/helicopter.  [  ]  I consent to the stated transfer, but refuse transportation by ambulance and accept full responsibility for my transportation by car.  I understand the risks of non-ambulance transfers and I exonerate the Hospital and its staff from any deterioration in my condition that results from this refusal.    X___________________________________________    DATE  24  TIME________  Signature of patient or legally responsible individual signing on patient behalf           RELATIONSHIP TO PATIENT_________________________          Provider Certification    NAME Demetri Miller                                         1975                              MRN [id number]    A medical screening exam was performed on the above named patient.  Based on the examination:    Condition Necessitating Transfer The primary encounter diagnosis was Scrotal abscess. A diagnosis of Severe sepsis (HCC) was also pertinent to this visit.    Patient Condition: The patient has been stabilized such that within reasonable medical probability, no material deterioration of the patient condition or the condition of the unborn child(khushi) is likely to result from the transfer    Reason for Transfer: Level of Care needed not available at this facility    Transfer Requirements: Facility [facility]   Space available and qualified personnel available for treatment as acknowledged by Kellie Hayden to accept transfer and to provide appropriate medical treatment as acknowledged by       Tonya  Appropriate medical records of the examination and treatment of the patient are provided at the time of transfer   STAFF INITIAL WHEN COMPLETED _______  Transfer will be performed by qualified personnel from    and appropriate transfer equipment as required, including the use of necessary and appropriate life support measures.    Provider Certification: I  have examined the patient and explained the following risks and benefits of being transferred/refusing transfer to the patient/family:  General risk, such as traffic hazards, adverse weather conditions, rough terrain or turbulence, possible failure of equipment (including vehicle or aircraft), or consequences of actions of persons outside the control of the transport personnel      Based on these reasonable risks and benefits to the patient and/or the unborn child(khushi), and based upon the information available at the time of the patient’s examination, I certify that the medical benefits reasonably to be expected from the provision of appropriate medical treatments at another medical facility outweigh the increasing risks, if any, to the individual’s medical condition, and in the case of labor to the unborn child, from effecting the transfer.    X____________________________________________ DATE 04/25/24        TIME_______      ORIGINAL - SEND TO MEDICAL RECORDS   COPY - SEND WITH PATIENT DURING TRANSFER

## 2024-04-27 VITALS
RESPIRATION RATE: 15 BRPM | HEIGHT: 70 IN | DIASTOLIC BLOOD PRESSURE: 72 MMHG | TEMPERATURE: 98.3 F | HEART RATE: 81 BPM | WEIGHT: 160.5 LBS | SYSTOLIC BLOOD PRESSURE: 116 MMHG | BODY MASS INDEX: 22.98 KG/M2 | OXYGEN SATURATION: 94 %

## 2024-04-27 PROBLEM — R65.10 SIRS (SYSTEMIC INFLAMMATORY RESPONSE SYNDROME) (HCC): Status: RESOLVED | Noted: 2024-04-26 | Resolved: 2024-04-27

## 2024-04-27 LAB
ANION GAP SERPL CALCULATED.3IONS-SCNC: 9 MMOL/L (ref 4–13)
BUN SERPL-MCNC: 13 MG/DL (ref 5–25)
CALCIUM SERPL-MCNC: 8.7 MG/DL (ref 8.4–10.2)
CHLORIDE SERPL-SCNC: 101 MMOL/L (ref 96–108)
CO2 SERPL-SCNC: 27 MMOL/L (ref 21–32)
CREAT SERPL-MCNC: 0.65 MG/DL (ref 0.6–1.3)
ERYTHROCYTE [DISTWIDTH] IN BLOOD BY AUTOMATED COUNT: 11.4 % (ref 11.6–15.1)
EST. AVERAGE GLUCOSE BLD GHB EST-MCNC: 229 MG/DL
GFR SERPL CREATININE-BSD FRML MDRD: 114 ML/MIN/1.73SQ M
GLUCOSE SERPL-MCNC: 199 MG/DL (ref 65–140)
GLUCOSE SERPL-MCNC: 217 MG/DL (ref 65–140)
HBA1C MFR BLD: 9.6 %
HCT VFR BLD AUTO: 43.9 % (ref 36.5–49.3)
HGB BLD-MCNC: 15 G/DL (ref 12–17)
MCH RBC QN AUTO: 29.8 PG (ref 26.8–34.3)
MCHC RBC AUTO-ENTMCNC: 34.2 G/DL (ref 31.4–37.4)
MCV RBC AUTO: 87 FL (ref 82–98)
PLATELET # BLD AUTO: 191 THOUSANDS/UL (ref 149–390)
PMV BLD AUTO: 10.5 FL (ref 8.9–12.7)
POTASSIUM SERPL-SCNC: 4 MMOL/L (ref 3.5–5.3)
RBC # BLD AUTO: 5.03 MILLION/UL (ref 3.88–5.62)
SODIUM SERPL-SCNC: 137 MMOL/L (ref 135–147)
WBC # BLD AUTO: 9.99 THOUSAND/UL (ref 4.31–10.16)

## 2024-04-27 PROCEDURE — 82948 REAGENT STRIP/BLOOD GLUCOSE: CPT

## 2024-04-27 PROCEDURE — 85027 COMPLETE CBC AUTOMATED: CPT

## 2024-04-27 PROCEDURE — NC001 PR NO CHARGE: Performed by: INTERNAL MEDICINE

## 2024-04-27 PROCEDURE — 80048 BASIC METABOLIC PNL TOTAL CA: CPT

## 2024-04-27 PROCEDURE — 99024 POSTOP FOLLOW-UP VISIT: CPT | Performed by: PHYSICIAN ASSISTANT

## 2024-04-27 PROCEDURE — 99235 HOSP IP/OBS SAME DATE MOD 70: CPT | Performed by: INTERNAL MEDICINE

## 2024-04-27 RX ORDER — SULFAMETHOXAZOLE AND TRIMETHOPRIM 400; 80 MG/1; MG/1
2 TABLET ORAL EVERY 12 HOURS SCHEDULED
Qty: 24 TABLET | Refills: 0 | Status: SHIPPED | OUTPATIENT
Start: 2024-04-27 | End: 2024-05-03

## 2024-04-27 RX ADMIN — SULFAMETHOXAZOLE AND TRIMETHOPRIM 2 TABLET: 400; 80 TABLET ORAL at 08:34

## 2024-04-27 RX ADMIN — OXYCODONE HYDROCHLORIDE 5 MG: 5 TABLET ORAL at 06:22

## 2024-04-27 RX ADMIN — INSULIN LISPRO 2 UNITS: 100 INJECTION, SOLUTION INTRAVENOUS; SUBCUTANEOUS at 08:34

## 2024-04-27 RX ADMIN — INSULIN LISPRO 2 UNITS: 100 INJECTION, SOLUTION INTRAVENOUS; SUBCUTANEOUS at 12:55

## 2024-04-27 NOTE — CASE MANAGEMENT
Case Management Discharge Planning Note    Patient name Demetri Miller  Location [facility] 919/[facility] 919-01 MRN [id number]  : 1975 Date 2024       Current Admission Date: 2024  Current Admission Diagnosis:Scrotal mass   Patient Active Problem List    Diagnosis Date Noted    Scrotal mass 2024    Pre-diabetes 2024    Psoriasis 2024      LOS (days): 1  Geometric Mean LOS (GMLOS) (days):   Days to GMLOS:     OBJECTIVE:  Risk of Unplanned Readmission Score: 6.68         Current admission status: Inpatient   Preferred Pharmacy:   [facility] [place] - [address]  [address]  [place] [address]  Phone: [phone] Fax: [phone]    Primary Care Provider: No primary care provider on file.    Primary Insurance: [employer]  Secondary Insurance:     DISCHARGE DETAILS:    Additional Comments: GALILEA FERRER was made aware by provider that pt is cleared for d/c today. Pt does not need VNA and feel comfortable doing his wound care. GALILEA FERRER requested extra wound care supplies be sent with pt.

## 2024-04-27 NOTE — DISCHARGE INSTR - OTHER ORDERS
Daily wound care  Location--right scrotum  Remove old packing until end  Rinse wound with normal saline  Pack--iodoform packing strip plain  Apply 4 x 4  Apply ABD over  Wear snug underwear or scrotal supporter

## 2024-04-27 NOTE — DISCHARGE SUMMARY
"      INTERNAL MEDICINE RESIDENCY DISCHARGE SUMMARY     Demetri Miller   48 y.o. male  MRN: [id number]  Room/Bed: [facility] 919/[facility] 919-01     [facility] BE [facility] 9   Encounter: [id number]    Principal Problem:    Scrotal mass  Active Problems:    Pre-diabetes    Psoriasis      * Scrotal mass  Assessment & Plan  Notes cyst on right testicle since Monday that has gotten more erythematous and painful which prompted ED evaluation  -US in ED-\"Complex collection with peripheral vascularity corresponds to the palpable right side scrotal lump. Differential includes small abscess collection versus unlikely scrotal neoplasm. Reactive wall thickening right hemiscrotum. \" Pt transferred to [facility], urology did bedside I&D and noted foul smelling purulent material    Urology consulted   Pt post bedside I&D 4//26.  Doing well post procedure, no complaints this morning.  Wound culture noted for 3+ disintegrating polys, 2+ GPC, 2+ gram variable rods. Blood cultures negative at 24 hours.  On Bactrim DS 2 tablets Q12, day 2/7. Plan to discharge to complete regimen outpatient.  Will follow up with urology in 1 week for wound check.  Dressing changes twice daily, patient instructed.  Pain regimen in place     Psoriasis  Assessment & Plan  Known history of psoriasis.  Not currently on any treatment.    Pre-diabetes  Assessment & Plan  Per patient does have history of prediabetes    SSI while inpatient.  Pending A1C  Follow up with PCP.    SIRS (systemic inflammatory response syndrome) (HCC)-resolved as of 4/27/2024  Assessment & Plan  Patient did meet SIRS criteria upon arrival being tachycardic and having elevated leukocytosis.  Lactic 1.2.  Pro-Colt 0.07.  Blood cultures drawn x 2 and pending  -Ultrasound did show findings that could be consistent with abscess    See A&P for scrotal mass.  Current stable vitals, feels well, appropriate for discharge.          DETAILS OF HOSPITAL COURSE     Per H&P: " "\"Patient is a 48-year-old with only known past medical history of diabetes but does not actively see physicians.  Patient is presenting due to a cyst that he found on his right testicle on Monday that has progressively been getting more erythematous and more painful.  Patient presented to [facility] and ultrasound was done showing potential abscess versus unlikely neoplasm.  Recommendation was to be sent to [facility] for further urology evaluation.  At [facility] patient presented afebrile, pulse 115, /74, 98% on room air.  Significant lab findings included WBC 14.65, creatinine 0.69, direct bilirubin 2.55 with repeat being 0.44, lactic 1.2, Pro-Colt 0.07, blood cultures x 2 pending.\"    Patient was transferred to [facility] and underwent beside I&D of scrotal abscess on 4/26. Patient did well after the procedure and was hemodynamically stable. Started on Bactrim.     This morning, patient was seen and examined. He feels well with no symptoms or concerns. Denies complaints including pain, fever, chills, SOB, N/V/C/D. ROS otherwise negative. Patient found to be medically stable for discharge and was prepared for discharge with plans for outpatient follow up with PCP and urology.    Physical Exam  Vitals reviewed.   Constitutional:       Appearance: Normal appearance.   HENT:      Head: Normocephalic and atraumatic.      Right Ear: External ear normal.      Left Ear: External ear normal.      Nose: Nose normal.      Mouth/Throat:      Mouth: Mucous membranes are moist.   Eyes:      Extraocular Movements: Extraocular movements intact.      Pupils: Pupils are equal, round, and reactive to light.   Cardiovascular:      Rate and Rhythm: Normal rate and regular rhythm.      Pulses: Normal pulses.      Heart sounds: Normal heart sounds.   Pulmonary:      Effort: Pulmonary effort is normal.      Breath sounds: Normal breath sounds.   Abdominal:      General: Abdomen is flat. Bowel sounds are normal.      " Palpations: Abdomen is soft.   Genitourinary:     Comments: Dressing in place, exam deferred.   Musculoskeletal:      Cervical back: No rigidity or tenderness.      Right lower leg: No edema.      Left lower leg: No edema.   Skin:     General: Skin is warm.      Capillary Refill: Capillary refill takes less than 2 seconds.   Neurological:      General: No focal deficit present.      Mental Status: He is alert and oriented to person, place, and time.          DISCHARGE INFORMATION     PCP at Discharge: N/A    Admitting Provider: Paulino Castaneda MD  Admission Date: 4/26/2024    Discharge Provider: Paulino Castaneda MD  Discharge Date: 4/27    Discharge Disposition: [facility]  Discharge Condition: stable  Discharge with Lines: no    Discharge Diet: regular diet  Activity Restrictions: none  Test Results Pending at Discharge: Hemoglobin A1C    Discharge Diagnoses:  Principal Problem:    Scrotal mass  Active Problems:    Pre-diabetes    Psoriasis  Resolved Problems:    SIRS (systemic inflammatory response syndrome) (HCC)      Consulting Providers:      Diagnostic & Therapeutic Procedures Performed:  No results found.    Code Status: Level 1 - Full Code  Advance Directive & Living Will: <no information>  Power of :    POLST:      Medications:  Current Discharge Medication List        Current Discharge Medication List        START taking these medications    Details   sulfamethoxazole-trimethoprim (BACTRIM) 400-80 mg per tablet Take 2 tablets by mouth every 12 (twelve) hours for 6 days  Qty: 24 tablet, Refills: 0    Associated Diagnoses: Scrotal mass           Current Discharge Medication List        Current Discharge Medication List          Allergies:  Allergies   Allergen Reactions    Aspirin Anaphylaxis    Penicillins Anaphylaxis       FOLLOW-UP     PCP Outpatient Follow-up:   Follow up: PCP  Follow up within next: 1 week    Consulting Providers Follow-up:  Specialty: Urology  Office phone number:  "Follow up within next: 2 weeks     Active Issues Requiring Follow-up:   none    Discharge Statement:   I spent 1 hour minutes discharging the patient. This time was spent on the day of discharge. I had direct contact with the patient on the day of discharge. Additional documentation is required if more than 30 minutes were spent on discharge.    Portions of the record may have been created with voice recognition software.  Occasional wrong word or \"sound a like\" substitutions may have occurred due to the inherent limitations of voice recognition software.  Read the chart carefully and recognize, using context, where substitutions have occurred.    ==  Carson Jackson MD  [facility]  Internal Medicine Resident PGY-2  "

## 2024-04-27 NOTE — PROGRESS NOTES
"Progress Note -urology  Demetri Miller 48 y.o. male MRN: [id number]  Unit/Bed#: [facility] 919-01 Encounter: [id number]    Assessment & Plan:    Scrotal abscess:  -Now status post bedside I&D on 4/26  -Patient afebrile hemodynamically stable  -Awaiting repeat labs this a.m.  -Status post bedside dressing and change today.  Surgical site clean and intact, indurated, no significant erythema, improved tenderness per patient.  No additional fluctuance appreciated through the entirety of the scrotum.  Old gauze packing was removed, incision flushed with 30 cc of normal saline, 1 inch packing placed until no longer able to place any more, 4 x 4's and ABD placed and underwear.  -Continue with dressing exchanges twice daily wet-to-dry packing.  -Patient afebrile hemodynamically stable, significantly improved in regards to pain and symptoms, leukocytosis resolved.  May provide empiric antibiotics will have urology follow-up on outpatient cultures for adjustment in antibiotics.  -Will need to assure patient has the ability to perform dressing exchange.  Walked patient through exchanges today.  May have additional exchange teaching versus VNA services if he does not feel that he is able to perform these exchanges.  Staff to continue exchanges patient.        Subjective/Objective   Chief Complaint: None    Subjective: Patient sitting comfortably in bed in no acute distress.  Reports significant improvement in his pain    Objective:     Blood pressure 134/70, pulse 102, temperature 99.2 °F (37.3 °C), resp. rate 16, height 5' 10\" (1.778 m), weight 72.8 kg (160 lb 7.9 oz), SpO2 96%.,Body mass index is 23.03 kg/m².      Intake/Output Summary (Last 24 hours) at 4/27/2024 0635  Last data filed at 4/27/2024 0019  Gross per 24 hour   Intake 240 ml   Output --   Net 240 ml       Invasive Devices       Peripheral Intravenous Line  Duration             Peripheral IV 04/25/24 Distal;Left;Upper;Ventral (anterior) Arm 1 day              " Patient requesting script to be sent to mail order pharmacy--was never filled locally.    RX pended for approval to [facility].         Physical Exam  Constitutional:       General: He is not in acute distress.     Appearance: He is normal weight. He is not ill-appearing, toxic-appearing or diaphoretic.   HENT:      Head: Normocephalic and atraumatic.      Right Ear: External ear normal.      Left Ear: External ear normal.      Nose: Nose normal.      Mouth/Throat:      Pharynx: Oropharynx is clear.   Eyes:      General: No scleral icterus.     Conjunctiva/sclera: Conjunctivae normal.   Cardiovascular:      Rate and Rhythm: Normal rate and regular rhythm.      Pulses: Normal pulses.      Heart sounds: No murmur heard.     No friction rub. No gallop.   Pulmonary:      Effort: Pulmonary effort is normal. No respiratory distress.   Abdominal:      General: Bowel sounds are normal. There is no distension.      Tenderness: There is no abdominal tenderness.   Genitourinary:     Comments: Surgical site clean and intact, indurated, no significant erythema, improved tenderness per patient.  No additional fluctuance appreciated through the entirety of the scrotum.  Old gauze packing was removed, incision flushed with 30 cc of normal saline, 1 inch packing placed until no longer able to place any more, 4 x 4's and ABD placed and underwear.    Musculoskeletal:         General: Normal range of motion.      Cervical back: Normal range of motion.   Skin:     General: Skin is warm and dry.   Neurological:      General: No focal deficit present.      Mental Status: He is alert and oriented to person, place, and time.           Lab, Imaging and other studies:  Lab Results   Component Value Date    WBC 14.65 (H) 04/25/2024    HGB 16.5 04/25/2024    HCT 49.3 04/25/2024    MCV 87 04/25/2024     04/25/2024     Lab Results   Component Value Date    SODIUM 136 04/25/2024    K 3.9 04/25/2024    CL 98 04/25/2024    CO2 27 04/25/2024    BUN 12 04/25/2024    CREATININE 0.69 04/25/2024    GLUC 174 (H) 04/25/2024    CALCIUM 9.9 04/25/2024         VTE Pharmacologic  Prophylaxis: Reason for no pharmacologic prophylaxis defer to primary team  VTE Mechanical Prophylaxis: sequential compression device    Diandra Stark PA-C

## 2024-04-27 NOTE — DISCHARGE INSTR - AVS FIRST PAGE
I have prescribed a medication, Bactrim DS. Please take 2 tablets twice daily for 5 days.  Please perform dressing changes twice daily as instructed by urology.  Please establish with a PCP for follow up appointment within the next 1 week. I have provided the infolink number.  Please follow up with urology as scheduled - wound check in 1 week.  I have placed a referral to wound care - please follow up as needed.  We will place a referral to home VNA.

## 2024-04-27 NOTE — PLAN OF CARE
Problem: PAIN - ADULT  Goal: Verbalizes/displays adequate comfort level or baseline comfort level  Description: Interventions:  - Encourage patient to monitor pain and request assistance  - Assess pain using appropriate pain scale  - Administer analgesics based on type and severity of pain and evaluate response  - Implement non-pharmacological measures as appropriate and evaluate response  - Consider cultural and social influences on pain and pain management  - Notify physician/advanced practitioner if interventions unsuccessful or patient reports new pain  Outcome: Progressing     Problem: INFECTION - ADULT  Goal: Absence or prevention of progression during hospitalization  Description: INTERVENTIONS:  - Assess and monitor for signs and symptoms of infection  - Monitor lab/diagnostic results  - Monitor all insertion sites, i.e. indwelling lines, tubes, and drains  - Monitor endotracheal if appropriate and nasal secretions for changes in amount and color  - [place] appropriate cooling/warming therapies per order  - Administer medications as ordered  - Instruct and encourage patient and family to use good hand hygiene technique  - Identify and instruct in appropriate isolation precautions for identified infection/condition  Outcome: Progressing  Goal: Absence of fever/infection during neutropenic period  Description: INTERVENTIONS:  - Monitor WBC    Outcome: Progressing     Problem: SAFETY ADULT  Goal: Patient will remain free of falls  Description: INTERVENTIONS:  - Educate patient/family on patient safety including physical limitations  - Instruct patient to call for assistance with activity   - Consult OT/PT to assist with strengthening/mobility   - Keep Call bell within reach  - Keep bed low and locked with side rails adjusted as appropriate  - Keep care items and personal belongings within reach  - Initiate and maintain comfort rounds  - Make Fall Risk Sign visible to staff  - Offer Toileting every 4 Hours,  in advance of need  - Initiate/Maintain 4alarm  - Obtain necessary fall risk management equipment: 4  - Apply yellow socks and bracelet for high fall risk patients  - Consider moving patient to room near nurses station  Outcome: Progressing  Goal: Maintain or return to baseline ADL function  Description: INTERVENTIONS:  -  Assess patient's ability to carry out ADLs; assess patient's baseline for ADL function and identify physical deficits which impact ability to perform ADLs (bathing, care of mouth/teeth, toileting, grooming, dressing, etc.)  - Assess/evaluate cause of self-care deficits   - Assess range of motion  - Assess patient's mobility; develop plan if impaired  - Assess patient's need for assistive devices and provide as appropriate  - Encourage maximum independence but intervene and supervise when necessary  - Involve family in performance of ADLs  - Assess for home care needs following discharge   - Consider OT consult to assist with ADL evaluation and planning for discharge  - Provide patient education as appropriate  Outcome: Progressing  Goal: Maintains/Returns to pre admission functional level  Description: INTERVENTIONS:  - Perform AM-PAC 6 Click Basic Mobility/ Daily Activity assessment daily.  - Set and communicate daily mobility goal to care team and patient/family/caregiver.   - Collaborate with rehabilitation services on mobility goals if consulted  - Perform Range of Motion 4 times a day.  - Reposition patient every 4 hours.  - Dangle patient 4 times a day  - Stand patient 4 times a day  - Ambulate patient 4 times a day  - Out of bed to chair 4 times a day   - Out of bed for meals 4 times a day  - Out of bed for toileting  - Record patient progress and toleration of activity level   Outcome: Progressing     Problem: DISCHARGE PLANNING  Goal: Discharge to home or other facility with appropriate resources  Description: INTERVENTIONS:  - Identify barriers to discharge w/patient and caregiver  -  Arrange for needed discharge resources and transportation as appropriate  - Identify discharge learning needs (meds, wound care, etc.)  - Arrange for interpretive services to assist at discharge as needed  - Refer to Case Management Department for coordinating discharge planning if the patient needs post-hospital services based on physician/advanced practitioner order or complex needs related to functional status, cognitive ability, or social support system  Outcome: Progressing     Problem: Knowledge Deficit  Goal: Patient/family/caregiver demonstrates understanding of disease process, treatment plan, medications, and discharge instructions  Description: Complete learning assessment and assess knowledge base.  Interventions:  - Provide teaching at level of understanding  - Provide teaching via preferred learning methods  Outcome: Progressing

## 2024-04-27 NOTE — PLAN OF CARE
Problem: PAIN - ADULT  Goal: Verbalizes/displays adequate comfort level or baseline comfort level  Description: Interventions:  - Encourage patient to monitor pain and request assistance  - Assess pain using appropriate pain scale  - Administer analgesics based on type and severity of pain and evaluate response  - Implement non-pharmacological measures as appropriate and evaluate response  - Consider cultural and social influences on pain and pain management  - Notify physician/advanced practitioner if interventions unsuccessful or patient reports new pain  Outcome: Progressing     Problem: INFECTION - ADULT  Goal: Absence or prevention of progression during hospitalization  Description: INTERVENTIONS:  - Assess and monitor for signs and symptoms of infection  - Monitor lab/diagnostic results  - Monitor all insertion sites, i.e. indwelling lines, tubes, and drains  - Monitor endotracheal if appropriate and nasal secretions for changes in amount and color  - [place] appropriate cooling/warming therapies per order  - Administer medications as ordered  - Instruct and encourage patient and family to use good hand hygiene technique  - Identify and instruct in appropriate isolation precautions for identified infection/condition  Outcome: Progressing  Goal: Absence of fever/infection during neutropenic period  Description: INTERVENTIONS:  - Monitor WBC    Outcome: Progressing     Problem: DISCHARGE PLANNING  Goal: Discharge to home or other facility with appropriate resources  Description: INTERVENTIONS:  - Identify barriers to discharge w/patient and caregiver  - Arrange for needed discharge resources and transportation as appropriate  - Identify discharge learning needs (meds, wound care, etc.)  - Arrange for interpretive services to assist at discharge as needed  - Refer to Case Management Department for coordinating discharge planning if the patient needs post-hospital services based on physician/advanced  practitioner order or complex needs related to functional status, cognitive ability, or social support system  Outcome: Progressing

## 2024-04-28 LAB
ATRIAL RATE: 112 BPM
BACTERIA BLD CULT: NORMAL
BACTERIA BLD CULT: NORMAL
P AXIS: 61 DEGREES
PR INTERVAL: 144 MS
QRS AXIS: 136 DEGREES
QRSD INTERVAL: 92 MS
QT INTERVAL: 332 MS
QTC INTERVAL: 453 MS
T WAVE AXIS: 48 DEGREES
VENTRICULAR RATE: 112 BPM

## 2024-04-28 PROCEDURE — 93010 ELECTROCARDIOGRAM REPORT: CPT | Performed by: INTERNAL MEDICINE

## 2024-04-29 LAB
BACTERIA WND AEROBE CULT: ABNORMAL
BACTERIA WND AEROBE CULT: ABNORMAL
GRAM STN SPEC: ABNORMAL

## 2024-04-29 NOTE — UTILIZATION REVIEW
"NOTIFICATION OF INPATIENT ADMISSION   AUTHORIZATION REQUEST   SERVICING FACILITY:   [facility]  Address: [address] [place] [address]  Tax ID: [id number]  NPI: [id number] ATTENDING PROVIDER:  Attending Name and NPI#: Paulino Castaneda Md [[id number]]  Address: [address] [place] [address]  Phone: [phone]   ADMISSION INFORMATION:  Place of Service: Inpatient [facility]  Place of Service Code: 21  Inpatient Admission Date/Time: 4/26/24  3:14 PM  Discharge Date/Time: 4/27/2024  2:35 PM  Admitting Diagnosis Code/Description:  Scrotal abscess [N49.2]     UTILIZATION REVIEW CONTACT:  Bhumika \"Gwen\" Eren Utilization   Network Utilization Review Department  Phone: [phone]  Fax: [phone]  Email: [email or web address]  Contact for approvals/pending authorizations, clinical reviews, and discharge.     PHYSICIAN ADVISORY SERVICES:  Medical Necessity Denial & [employer]uc[employer]-wr-Elf[employer] Review  Phone: [phone]  Fax: [phone]  Email: [email or web address]     DISCHARGE SUPPORT TEAM:  For Patients Discharge Needs & Updates  Phone: [phone] opt. 2 Fax: [phone]  Email: [email or web address]      "

## 2024-04-29 NOTE — UTILIZATION REVIEW
Initial Clinical Review    Admission: Date/Time/Statement:   Admission Orders (From admission, onward)       Ordered        04/26/24 1627  INPATIENT ADMISSION  Once                          Orders Placed This Encounter   Procedures    INPATIENT ADMISSION     Standing Status:   Standing     Number of Occurrences:   1     Order Specific Question:   Level of Care     Answer:   Med Surg [16]     Order Specific Question:   Estimated length of stay     Answer:   More than 2 Midnights     Order Specific Question:   Certification     Answer:   I certify that inpatient services are medically necessary for this patient for a duration of greater than two midnights. See H&P and MD Progress Notes for additional information about the patient's course of treatment.     Initial Presentation: 48 y.o. male with pre-diabetes to [facility] transferred from [facility] ED where he initially presented d/t a cyst noted on his R testicle a few days ago that has been getting progressively more erythematous and painful. At [facility] ED an US was done showing potential abscess vs unlikely neoplasm. WBC 14.65, creatinine 0.69, direct bilirubin 2.55 with repeat 0.44, lactic 1.2, Pro-Colt 0.07, blood cultures x 2 drawn. Tx'd to [facility] for Urology eval and further tx.  Admitted Inpatient to MS unit with Scrotal Mass, SIRS -- Urology consulted:  Clinical abscess on exam with fluctuance at the dependent portion of the right hemiscrotum. Tx options discussed at bedside: operative I&D or an I&D at the bedside. Given some of the current OR constraints, it is unclear when the pt will be able to proceed to the OR and pt prefers to avoid anesthetic as this creates some anxiety for him. He be very willing to again undergo a bedside I&D with appropriate sedation and numbing.     INCISION AND DRAINAGE:   Approximately 50 to 100 cc of brown purulence was expressed.  The area was probed with a Q-tip.  Irrigated with sterile saline.  The remainder  of the 10 cc of 2% lidocaine plain was instilled around and in the area.     Noniodoform packing was placed into the abscess cavity.  Gauze and an ABD pad were placed over top along with mesh panties.     Patient felt relief after the abscess was drained and tolerated the procedure well.     Plan will be for packing exchange in the next 24 hours                 Post bedside I&D -  Ceftriaxone 2 g q24h, f/u pending cxs. Analgesics.  Cons carb diet with accu-checks w/ ssi.         Date: 4/27   Day 2: s/p bedside I&D 4/26 of scrotal abscess. Afebrile. Dressing change completed by urology. Surgical site clean and intact, indurated, no significant erythema, improved tenderness per pt. No additional fluctuance appreciated through the entirety of the scrotum. Old gauze packing was removed, incision flushed with 30 cc of NSS, 1 inch packing placed until no longer able to place any more, 4 x 4's and ABD placed and underwear. Continue with dressing exchanges 2/week, W-D packing. Continue abx, prn analgesics.        ED Triage Vitals   Temperature Pulse Respirations Blood Pressure SpO2   04/26/24 1523 04/26/24 1523 04/26/24 1523 04/26/24 1523 04/26/24 1523   98.6 °F (37 °C) (!) 107 18 149/91 97 %      Temp Source Heart Rate Source Patient Position - Orthostatic VS BP Location FiO2 (%)   04/26/24 1523 -- -- -- --   Oral          Pain Score       04/26/24 1645       No Pain          Wt Readings from Last 1 Encounters:   04/26/24 72.8 kg (160 lb 7.9 oz)     Additional Vital Signs:   Date/Time Temp Pulse Resp BP MAP (mmHg) SpO2 O2 Device   04/27/24 07:28:39 98.3 °F (36.8 °C) 81 15 116/72 87 94 % --   04/26/24 23:32:07 99.2 °F (37.3 °C) 102 16 134/70 91 96 % --   04/26/24 2015 -- -- -- -- -- -- None (Room air)   04/26/24 15:23:08 98.6 °F (37 °C) 107 Abnormal  18 149/91 110 97 % --       Pertinent Labs/Diagnostic Test Results:   US scrotum and testicles: 4/25/2024  Impression: Complex collection with peripheral vascularity  corresponds to the palpable right side scrotal lump. Differential includes small abscess collection versus unlikely scrotal neoplasm. Reactive wall thickening right hemiscrotum. Urology consultation recommended. Both testes appear grossly unremarkable. Slightly increased peak systolic velocities on the right compared to the left testicle difficult to exclude mild right-sided orchitis. Incidentally noted subcentimeter left epididymal head cyst or spermatocele.        Results from last 7 days   Lab Units 04/27/24  0535 04/25/24  1717   WBC Thousand/uL 9.99 14.65*   HEMOGLOBIN g/dL 15.0 16.5   HEMATOCRIT % 43.9 49.3   PLATELETS Thousands/uL 191 215   TOTAL NEUT ABS Thousands/µL  --  11.50*         Results from last 7 days   Lab Units 04/27/24  0535 04/25/24  1717   SODIUM mmol/L 137 136   POTASSIUM mmol/L 4.0 3.9   CHLORIDE mmol/L 101 98   CO2 mmol/L 27 27   ANION GAP mmol/L 9 11   BUN mg/dL 13 12   CREATININE mg/dL 0.65 0.69   EGFR ml/min/1.73sq m 114 111   CALCIUM mg/dL 8.7 9.9     Results from last 7 days   Lab Units 04/25/24  1717   AST U/L 12*   ALT U/L 24   ALK PHOS U/L 116*   TOTAL PROTEIN g/dL 8.0   ALBUMIN g/dL 4.5   TOTAL BILIRUBIN mg/dL 2.55*   BILIRUBIN DIRECT mg/dL 0.44*     Results from last 7 days   Lab Units 04/27/24  0807 04/26/24  2113 04/26/24  1715 04/25/24  1706   POC GLUCOSE mg/dl 217* 212* 119 171*     Results from last 7 days   Lab Units 04/27/24  0535 04/25/24  1717   GLUCOSE RANDOM mg/dL 199* 174*         Results from last 7 days   Lab Units 04/25/24  1717   HEMOGLOBIN A1C % 9.6*   EAG mg/dl 229       Results from last 7 days   Lab Units 04/25/24  1717   PROTIME seconds 13.4   INR  0.98   PTT seconds 24         Results from last 7 days   Lab Units 04/25/24  1717   PROCALCITONIN ng/ml 0.07     Results from last 7 days   Lab Units 04/25/24  1717   LACTIC ACID mmol/L 1.2         Results from last 7 days   Lab Units 04/26/24  1821 04/25/24  1717   BLOOD CULTURE   --  No Growth at 72 hrs.  No  Growth at 72 hrs.   GRAM STAIN RESULT  3+ Disintegrating polys*  2+ Gram positive cocci in pairs*  2+ Gram variable rods*  --    WOUND CULTURE  3+ Growth of Beta Hemolytic Streptococcus Group B*  --      Past Medical History:   Diagnosis Date    Diabetes mellitus (HCC)     prediabetic     Psoriasis      Present on Admission:   Scrotal mass   Pre-diabetes   (Resolved) SIRS (systemic inflammatory response syndrome) (HCC)   Psoriasis      Admitting Diagnosis: Scrotal abscess [N49.2]  Age/Sex: 48 y.o. male  Admission Orders:  Medications 04/26 04/27    insulin lispro (HumALOG/ADMELOG) 100 units/mL subcutaneous injection 1-5 Units  Dose: 1-5 Units  Freq: 4 times daily (before meals and at bedtime) Route: SC  Start: 04/26/24 1630 End: 04/27/24 1640   Admin Instructions:       [phone] [id number]     1640-D/C'd      lidocaine (LMX) 4 % cream  Freq: Once Route: TP  Start: 04/26/24 1730 End: 04/26/24 1655    1655         lidocaine (PF) (XYLOCAINE-MPF) 2 % injection 10 mL  Dose: 10 mL  Freq: Once Route: INFILTRATION  Start: 04/26/24 1600 End: 04/26/24 1655    1655         LORazepam (ATIVAN) injection 1 mg  Dose: 1 mg  Freq: Once Route: IV  Start: 04/26/24 1600 End: 04/26/24 1626    1626         sulfamethoxazole-trimethoprim (BACTRIM) 400-80 mg per tablet 2 tablet  Dose: 2 tablet  Freq: Every 12 hours scheduled Route: PO  Start: 04/26/24 2100 End: 04/27/24 1640    2110      0834     1640-D/C'd      vancomycin (VANCOCIN) 1500 mg in sodium chloride 0.9% 250 mL IVPB  Dose: 1,500 mg  Freq: Every 12 hours Route: IV  Start: 04/26/24 1115 End: 04/26/24 1514    1115     1514-D/C'd           Continuous IV Infusions:  sodium chloride 0.9 % infusion  Rate: 100 mL/hr Dose: 100 mL/hr  Freq: Continuous Route: IV  Indications of Use: IV Hydration  Last Dose: 100 mL/hr (04/26/24 1104)  Start: 04/26/24 1100 End: 04/26/24 1514     PRN Meds:  Oxycodone IR 5 mg PO Q4H PRN 4/27 0622 x1        Network Utilization Review  Department  ATTENTION: Please call with any questions or concerns to [phone] and carefully listen to the prompts so that you are directed to the right person. All voicemails are confidential.   For Discharge needs, contact Care Management DC Support Team at [phone] opt. 2  Send all requests for admission clinical reviews, approved or denied determinations and any other requests to dedicated fax number below belonging to the campus where the patient is receiving treatment. List of dedicated fax numbers for the Facilities:  FACILITY NAME UR FAX NUMBER   ADMISSION DENIALS (Administrative/Medical Necessity) [phone]   DISCHARGE SUPPORT TEAM (NETWORK) [phone]   PARENT CHILD HEALTH (Maternity/NICU/Pediatrics) [phone]   [facility] [phone]   [facility] [phone]   [facility] [phone]   [facility] [phone]   [facility] [phone]   [facility] [phone]   [facility] [phone]   [facility] [phone]   [facility] [phone]   [facility] [phone]   [facility] [phone]   [facility] [phone]

## 2024-04-30 LAB
BACTERIA BLD CULT: NORMAL
BACTERIA BLD CULT: NORMAL

## 2024-05-09 NOTE — PROGRESS NOTES
"  Assessment and plan:     Scrotal abscess  Scrotal abscess seen on consultation by Dr. Vazquez on 4/26/2024 bedside scrotal I&D performed approximately 50 to 100 cc of brown purulent drainage was expressed from wound patient was able to tolerate bedside procedure well discharged from hospital on 4/27/2024  Wound culture noted for 3+ disintegrating polys, 2+ GPC, 2+ gram variable rods, blood cultures negative at 24 hours  Discharged home with Bactrim every 12 hours completed full course of antibiotics  Instructed to change dressing twice daily initially, currently using 1 piece of gauze per day for minimal drainage  Wound today clean dry and intact with no purulent drainage noted, mild induration surrounding incision site, incision site close to being fully healed on outside, no pain or tenderness on palpation  Follow-up in 3 months to assess site and adequate healing  Instructed patient to reach out to our office if he notices any signs or symptoms of infection      History of Present Illness     Demetri Miller is a 48 y.o. male who presents today for follow-up of scrotal abscess.  He had a bedside I&D on 4/26/2024 by Dr. Vazquez.  He was placed on antibiotics and patient states that he completed full course.  He states that this has happened once before in the past several years ago and abscess at that time also required bedside I&D.  He states he has been using 1 piece of gauze at the site to help with the minimal drainage that he is noticing.  He states that he has no pain.  He is not taking any pain medication or over-the-counter pain medication.  He denies any urinary or urological complaints today in the office.    Laboratory     Lab Results   Component Value Date    BUN 13 04/27/2024    CREATININE 0.65 04/27/2024       No components found for: \"GFR\"    Lab Results   Component Value Date    CALCIUM 8.7 04/27/2024    K 4.0 04/27/2024    CO2 27 04/27/2024     04/27/2024       Lab Results " "  Component Value Date    WBC 9.99 04/27/2024    HGB 15.0 04/27/2024    HCT 43.9 04/27/2024    MCV 87 04/27/2024     04/27/2024       No results found for: \"PSA\"    No results found for this or any previous visit (from the past 1 hour(s)).    Review of Systems     Review of Systems   Constitutional:  Negative for chills and fever.   Respiratory: Negative.  Negative for cough and shortness of breath.    Cardiovascular: Negative.  Negative for chest pain.   Gastrointestinal: Negative.  Negative for abdominal distention, abdominal pain, nausea and vomiting.   Genitourinary:  Negative for decreased urine volume, difficulty urinating, dysuria, flank pain, frequency, hematuria, penile discharge, penile pain, penile swelling, scrotal swelling, testicular pain and urgency.   Skin: Negative.  Negative for rash.   Neurological: Negative.    Hematological:  Negative for adenopathy. Does not bruise/bleed easily.         Allergies     Allergies   Allergen Reactions    Aspirin Anaphylaxis    Penicillins Anaphylaxis       Physical Exam     Physical Exam  Genitourinary:     Penis: Normal.       Comments: Incision site very close to being fully healed, clean dry and intact with no signs of infection or purulent drainage, no pain or tenderness upon palpation, mild induration noted around incision site        Vital Signs     Vitals:    05/10/24 1125   BP: 168/100   BP Location: Left arm   Patient Position: Sitting   Cuff Size: Adult   Pulse: (!) 106   SpO2: 96%   Weight: 73.9 kg (163 lb)       Current Medications       Current Outpatient Medications:     acetaminophen (Tylenol) 325 mg tablet, Take by mouth, Disp: , Rfl:     Active Problems     Patient Active Problem List   Diagnosis    Scrotal abscess    Pre-diabetes    Psoriasis       Past Medical History     Past Medical History:   Diagnosis Date    Diabetes mellitus (HCC)     prediabetic     Psoriasis        Surgical History     Past Surgical History:   Procedure Laterality " Date    INCISION AND DRAINAGE  4/26/2024       Family History     Family History   Problem Relation Age of Onset    No Known Problems Father     No Known Problems Mother        Social History     Social History     Social History     Tobacco Use   Smoking Status Every Day    Current packs/day: 0.50    Types: Cigarettes   Smokeless Tobacco Never       Past Surgical History:   Procedure Laterality Date    INCISION AND DRAINAGE  4/26/2024         The following portions of the patient's history were reviewed and updated as appropriate: allergies, current medications, past family history, past medical history, past social history, past surgical history and problem list    Please note :  Voice dictation software has been used to create this document.  There may be inadvertent transcription errors.    Wili Cramer

## 2024-05-10 ENCOUNTER — OFFICE VISIT (OUTPATIENT)
Dept: UROLOGY | Facility: AMBULATORY SURGERY CENTER | Age: 49
End: 2024-05-10
Payer: COMMERCIAL

## 2024-05-10 VITALS
SYSTOLIC BLOOD PRESSURE: 168 MMHG | DIASTOLIC BLOOD PRESSURE: 100 MMHG | HEART RATE: 106 BPM | BODY MASS INDEX: 23.39 KG/M2 | WEIGHT: 163 LBS | OXYGEN SATURATION: 96 %

## 2024-05-10 DIAGNOSIS — N50.89 SCROTAL MASS: ICD-10-CM

## 2024-05-10 DIAGNOSIS — N49.2 SCROTAL ABSCESS: Primary | ICD-10-CM

## 2024-05-10 PROCEDURE — 99213 OFFICE O/P EST LOW 20 MIN: CPT

## 2024-05-10 RX ORDER — ACETAMINOPHEN 325 MG/1
TABLET ORAL
COMMUNITY

## 2024-05-10 NOTE — ASSESSMENT & PLAN NOTE
Scrotal abscess seen on consultation by Dr. Vazquez on 4/26/2024 bedside scrotal I&D performed approximately 50 to 100 cc of brown purulent drainage was expressed from wound patient was able to tolerate bedside procedure well discharged from hospital on 4/27/2024  Wound culture noted for 3+ disintegrating polys, 2+ GPC, 2+ gram variable rods, blood cultures negative at 24 hours  Discharged home with Bactrim every 12 hours completed full course of antibiotics  Instructed to change dressing twice daily initially, currently using 1 piece of gauze per day for minimal drainage  Wound today clean dry and intact with no purulent drainage noted, mild induration surrounding incision site, incision site close to being fully healed on outside, no pain or tenderness on palpation  Follow-up in 3 months to assess site and adequate healing  Instructed patient to reach out to our office if he notices any signs or symptoms of infection

## 2024-05-26 ENCOUNTER — HOSPITAL ENCOUNTER (EMERGENCY)
Facility: HOSPITAL | Age: 49
Discharge: HOME | End: 2024-05-26
Attending: EMERGENCY MEDICINE | Admitting: EMERGENCY MEDICINE
Payer: COMMERCIAL

## 2024-05-26 ENCOUNTER — HOSPITAL ENCOUNTER (INPATIENT)
Facility: HOSPITAL | Age: 49
LOS: 2 days | Discharge: HOME | DRG: 728 | End: 2024-05-28
Attending: EMERGENCY MEDICINE | Admitting: INTERNAL MEDICINE
Payer: COMMERCIAL

## 2024-05-26 ENCOUNTER — APPOINTMENT (EMERGENCY)
Dept: RADIOLOGY | Facility: HOSPITAL | Age: 49
DRG: 728 | End: 2024-05-26
Payer: COMMERCIAL

## 2024-05-26 VITALS
WEIGHT: 160 LBS | BODY MASS INDEX: 22.9 KG/M2 | HEIGHT: 70 IN | RESPIRATION RATE: 18 BRPM | DIASTOLIC BLOOD PRESSURE: 96 MMHG | SYSTOLIC BLOOD PRESSURE: 193 MMHG | HEART RATE: 115 BPM | OXYGEN SATURATION: 96 % | TEMPERATURE: 99 F

## 2024-05-26 DIAGNOSIS — N49.2 SCROTAL ABSCESS: Primary | ICD-10-CM

## 2024-05-26 DIAGNOSIS — E11.9 TYPE 2 DIABETES MELLITUS WITHOUT COMPLICATION, WITHOUT LONG-TERM CURRENT USE OF INSULIN (HCC): ICD-10-CM

## 2024-05-26 PROBLEM — R03.0 ELEVATED BP WITHOUT DIAGNOSIS OF HYPERTENSION: Status: ACTIVE | Noted: 2024-05-26

## 2024-05-26 LAB
ALBUMIN SERPL BCP-MCNC: 3.9 G/DL (ref 3.5–5)
ALP SERPL-CCNC: 92 U/L (ref 34–104)
ALT SERPL W P-5'-P-CCNC: 20 U/L (ref 7–52)
ANION GAP SERPL CALCULATED.3IONS-SCNC: 9 MMOL/L (ref 4–13)
APTT PPP: 25 SECONDS (ref 23–37)
AST SERPL W P-5'-P-CCNC: 15 U/L (ref 13–39)
BACTERIA UR QL AUTO: NORMAL /HPF
BASOPHILS # BLD AUTO: 0.02 THOUSANDS/ÂΜL (ref 0–0.1)
BASOPHILS NFR BLD AUTO: 0 % (ref 0–1)
BILIRUB SERPL-MCNC: 1.46 MG/DL (ref 0.2–1)
BILIRUB UR QL STRIP: NEGATIVE
BUN SERPL-MCNC: 8 MG/DL (ref 5–25)
CALCIUM SERPL-MCNC: 8.8 MG/DL (ref 8.4–10.2)
CHLORIDE SERPL-SCNC: 101 MMOL/L (ref 96–108)
CLARITY UR: CLEAR
CO2 SERPL-SCNC: 25 MMOL/L (ref 21–32)
COLOR UR: ABNORMAL
CREAT SERPL-MCNC: 0.55 MG/DL (ref 0.6–1.3)
EOSINOPHIL # BLD AUTO: 0.03 THOUSAND/ÂΜL (ref 0–0.61)
EOSINOPHIL NFR BLD AUTO: 0 % (ref 0–6)
ERYTHROCYTE [DISTWIDTH] IN BLOOD BY AUTOMATED COUNT: 11.8 % (ref 11.6–15.1)
GFR SERPL CREATININE-BSD FRML MDRD: 122 ML/MIN/1.73SQ M
GLUCOSE SERPL-MCNC: 137 MG/DL (ref 65–140)
GLUCOSE SERPL-MCNC: 177 MG/DL (ref 65–140)
GLUCOSE SERPL-MCNC: 210 MG/DL (ref 65–140)
GLUCOSE UR STRIP-MCNC: ABNORMAL MG/DL
HCT VFR BLD AUTO: 43.7 % (ref 36.5–49.3)
HGB BLD-MCNC: 15 G/DL (ref 12–17)
HGB UR QL STRIP.AUTO: NEGATIVE
IMM GRANULOCYTES # BLD AUTO: 0.04 THOUSAND/UL (ref 0–0.2)
IMM GRANULOCYTES NFR BLD AUTO: 0 % (ref 0–2)
INR PPP: 1.03 (ref 0.84–1.19)
KETONES UR STRIP-MCNC: ABNORMAL MG/DL
LACTATE SERPL-SCNC: 1 MMOL/L (ref 0.5–2)
LEUKOCYTE ESTERASE UR QL STRIP: ABNORMAL
LYMPHOCYTES # BLD AUTO: 1.46 THOUSANDS/ÂΜL (ref 0.6–4.47)
LYMPHOCYTES NFR BLD AUTO: 15 % (ref 14–44)
MCH RBC QN AUTO: 29.8 PG (ref 26.8–34.3)
MCHC RBC AUTO-ENTMCNC: 34.3 G/DL (ref 31.4–37.4)
MCV RBC AUTO: 87 FL (ref 82–98)
MONOCYTES # BLD AUTO: 0.92 THOUSAND/ÂΜL (ref 0.17–1.22)
MONOCYTES NFR BLD AUTO: 9 % (ref 4–12)
NEUTROPHILS # BLD AUTO: 7.63 THOUSANDS/ÂΜL (ref 1.85–7.62)
NEUTS SEG NFR BLD AUTO: 76 % (ref 43–75)
NITRITE UR QL STRIP: NEGATIVE
NON-SQ EPI CELLS URNS QL MICRO: NORMAL /HPF
NRBC BLD AUTO-RTO: 0 /100 WBCS
PH UR STRIP.AUTO: 5.5 [PH]
PLATELET # BLD AUTO: 207 THOUSANDS/UL (ref 149–390)
PMV BLD AUTO: 9.8 FL (ref 8.9–12.7)
POTASSIUM SERPL-SCNC: 3.5 MMOL/L (ref 3.5–5.3)
PROCALCITONIN SERPL-MCNC: <0.05 NG/ML
PROT SERPL-MCNC: 6.9 G/DL (ref 6.4–8.4)
PROT UR STRIP-MCNC: NEGATIVE MG/DL
PROTHROMBIN TIME: 13.4 SECONDS (ref 11.6–14.5)
RBC # BLD AUTO: 5.03 MILLION/UL (ref 3.88–5.62)
RBC #/AREA URNS AUTO: NORMAL /HPF
SODIUM SERPL-SCNC: 135 MMOL/L (ref 135–147)
SP GR UR STRIP.AUTO: 1.02 (ref 1–1.03)
UROBILINOGEN UR STRIP-ACNC: <2 MG/DL
WBC # BLD AUTO: 10.1 THOUSAND/UL (ref 4.31–10.16)
WBC #/AREA URNS AUTO: NORMAL /HPF

## 2024-05-26 PROCEDURE — 99222 1ST HOSP IP/OBS MODERATE 55: CPT | Performed by: UROLOGY

## 2024-05-26 PROCEDURE — 76870 US EXAM SCROTUM: CPT

## 2024-05-26 PROCEDURE — 83605 ASSAY OF LACTIC ACID: CPT

## 2024-05-26 PROCEDURE — 36415 COLL VENOUS BLD VENIPUNCTURE: CPT

## 2024-05-26 PROCEDURE — 99284 EMERGENCY DEPT VISIT MOD MDM: CPT

## 2024-05-26 PROCEDURE — 85610 PROTHROMBIN TIME: CPT

## 2024-05-26 PROCEDURE — 82948 REAGENT STRIP/BLOOD GLUCOSE: CPT

## 2024-05-26 PROCEDURE — 80053 COMPREHEN METABOLIC PANEL: CPT

## 2024-05-26 PROCEDURE — 96360 HYDRATION IV INFUSION INIT: CPT

## 2024-05-26 PROCEDURE — 99285 EMERGENCY DEPT VISIT HI MDM: CPT | Performed by: EMERGENCY MEDICINE

## 2024-05-26 PROCEDURE — 84145 PROCALCITONIN (PCT): CPT

## 2024-05-26 PROCEDURE — 85730 THROMBOPLASTIN TIME PARTIAL: CPT

## 2024-05-26 PROCEDURE — 81001 URINALYSIS AUTO W/SCOPE: CPT

## 2024-05-26 PROCEDURE — 85025 COMPLETE CBC W/AUTO DIFF WBC: CPT

## 2024-05-26 PROCEDURE — 87040 BLOOD CULTURE FOR BACTERIA: CPT

## 2024-05-26 PROCEDURE — 99283 EMERGENCY DEPT VISIT LOW MDM: CPT

## 2024-05-26 PROCEDURE — 93005 ELECTROCARDIOGRAM TRACING: CPT

## 2024-05-26 RX ORDER — ENOXAPARIN SODIUM 100 MG/ML
40 INJECTION SUBCUTANEOUS DAILY
Status: DISCONTINUED | OUTPATIENT
Start: 2024-05-27 | End: 2024-05-28 | Stop reason: HOSPADM

## 2024-05-26 RX ORDER — ACETAMINOPHEN 325 MG/1
975 TABLET ORAL EVERY 8 HOURS SCHEDULED
Status: DISCONTINUED | OUTPATIENT
Start: 2024-05-26 | End: 2024-05-28 | Stop reason: HOSPADM

## 2024-05-26 RX ORDER — HYDROMORPHONE HCL IN WATER/PF 6 MG/30 ML
0.2 PATIENT CONTROLLED ANALGESIA SYRINGE INTRAVENOUS EVERY 2 HOUR PRN
Status: DISCONTINUED | OUTPATIENT
Start: 2024-05-26 | End: 2024-05-28 | Stop reason: HOSPADM

## 2024-05-26 RX ORDER — MIDAZOLAM HYDROCHLORIDE 2 MG/2ML
2 INJECTION, SOLUTION INTRAMUSCULAR; INTRAVENOUS ONCE
Status: DISCONTINUED | OUTPATIENT
Start: 2024-05-26 | End: 2024-05-26

## 2024-05-26 RX ORDER — SULFAMETHOXAZOLE AND TRIMETHOPRIM 400; 80 MG/1; MG/1
2 TABLET ORAL EVERY 12 HOURS SCHEDULED
Status: DISCONTINUED | OUTPATIENT
Start: 2024-05-26 | End: 2024-05-26

## 2024-05-26 RX ORDER — SULFAMETHOXAZOLE AND TRIMETHOPRIM 400; 80 MG/1; MG/1
2 TABLET ORAL EVERY 12 HOURS SCHEDULED
Status: DISCONTINUED | OUTPATIENT
Start: 2024-05-27 | End: 2024-05-28 | Stop reason: HOSPADM

## 2024-05-26 RX ORDER — HYDROMORPHONE HCL/PF 1 MG/ML
0.5 SYRINGE (ML) INJECTION ONCE
Status: DISCONTINUED | OUTPATIENT
Start: 2024-05-26 | End: 2024-05-26 | Stop reason: HOSPADM

## 2024-05-26 RX ORDER — OXYCODONE HYDROCHLORIDE 5 MG/1
5 TABLET ORAL EVERY 4 HOURS PRN
Status: DISCONTINUED | OUTPATIENT
Start: 2024-05-26 | End: 2024-05-28 | Stop reason: HOSPADM

## 2024-05-26 RX ORDER — LIDOCAINE HYDROCHLORIDE 10 MG/ML
10 INJECTION, SOLUTION EPIDURAL; INFILTRATION; INTRACAUDAL; PERINEURAL ONCE
Status: COMPLETED | OUTPATIENT
Start: 2024-05-26 | End: 2024-05-26

## 2024-05-26 RX ORDER — ACETAMINOPHEN 325 MG/1
650 TABLET ORAL EVERY 4 HOURS PRN
Status: DISCONTINUED | OUTPATIENT
Start: 2024-05-26 | End: 2024-05-26

## 2024-05-26 RX ORDER — ACETAMINOPHEN 325 MG/1
975 TABLET ORAL EVERY 8 HOURS SCHEDULED
Status: DISCONTINUED | OUTPATIENT
Start: 2024-05-26 | End: 2024-05-26

## 2024-05-26 RX ORDER — LIDOCAINE 40 MG/G
CREAM TOPICAL ONCE
Status: COMPLETED | OUTPATIENT
Start: 2024-05-26 | End: 2024-05-26

## 2024-05-26 RX ORDER — SULFAMETHOXAZOLE AND TRIMETHOPRIM 800; 160 MG/1; MG/1
1 TABLET ORAL ONCE
Status: COMPLETED | OUTPATIENT
Start: 2024-05-26 | End: 2024-05-26

## 2024-05-26 RX ORDER — INSULIN LISPRO 100 [IU]/ML
1-5 INJECTION, SOLUTION INTRAVENOUS; SUBCUTANEOUS
Status: DISCONTINUED | OUTPATIENT
Start: 2024-05-26 | End: 2024-05-28 | Stop reason: HOSPADM

## 2024-05-26 RX ORDER — INSULIN GLARGINE 100 [IU]/ML
6 INJECTION, SOLUTION SUBCUTANEOUS
Status: DISCONTINUED | OUTPATIENT
Start: 2024-05-26 | End: 2024-05-28 | Stop reason: HOSPADM

## 2024-05-26 RX ADMIN — ACETAMINOPHEN 975 MG: 325 TABLET, FILM COATED ORAL at 19:03

## 2024-05-26 RX ADMIN — Medication 2.5 MG: at 23:31

## 2024-05-26 RX ADMIN — INSULIN LISPRO 2 UNITS: 100 INJECTION, SOLUTION INTRAVENOUS; SUBCUTANEOUS at 23:26

## 2024-05-26 RX ADMIN — LIDOCAINE HYDROCHLORIDE 10 ML: 10 INJECTION, SOLUTION EPIDURAL; INFILTRATION; INTRACAUDAL; PERINEURAL at 15:35

## 2024-05-26 RX ADMIN — SODIUM CHLORIDE 1000 ML: 0.9 INJECTION, SOLUTION INTRAVENOUS at 15:39

## 2024-05-26 RX ADMIN — LIDOCAINE 4%: 4 CREAM TOPICAL at 15:34

## 2024-05-26 RX ADMIN — INSULIN GLARGINE 6 UNITS: 100 INJECTION, SOLUTION SUBCUTANEOUS at 23:26

## 2024-05-26 RX ADMIN — SULFAMETHOXAZOLE AND TRIMETHOPRIM 1 TABLET: 800; 160 TABLET ORAL at 17:21

## 2024-05-26 NOTE — ASSESSMENT & PLAN NOTE
BP on admission 203/87 improved to 153/75   2 out of 2 setting of pain.    BP less than 160 while inpatient  F/u with PCP

## 2024-05-26 NOTE — ED ATTENDING ATTESTATION
"5/26/2024  I, Fly Flanagan DO, saw and evaluated the patient. I have discussed the patient with the resident/non-physician practitioner and agree with the resident's/non-physician practitioner's findings, Plan of Care, and MDM as documented in the resident's/non-physician practitioner's note, except where noted. All available labs and Radiology studies were reviewed.  I was present for key portions of any procedure(s) performed by the resident/non-physician practitioner and I was immediately available to provide assistance.       At this point I agree with the current assessment done in the Emergency Department.  I have conducted an independent evaluation of this patient a history and physical is as follows:    48 male presents for evaluation of L sided scrotal swelling and pain. Hx of abscess to the area, was drained at bedside by urology last month. He completed a course of tmp/smx. Symptoms return over the past few days. He is having subjective fever, no rigors. No urinary sxs. No other c/o at this time.    When asked if he has diabetes he says \"ya, no\" but then he had an A1C from 4/25/24 of 9.6    POCUS SSTI by dr rodríguez under my direct supervision reveals multiloculated fluid collection involving the left hemiscrotum c/w large multiloculated abscess. Performed on 5/26/24 at 1500 for diagnostic purposes.    Imp: L scrotal abscess in setting of untreated diabetes plan: labs, analgesia, c/s urology this likely will require I&D in OR given multiloculated nature, rapid recurrence after recent treatment, and severe uncontrolled diabetes.        ED Course         Critical Care Time  Procedures      "

## 2024-05-26 NOTE — CONSULTS
Consultation - Urology  Demetri Miller 48 y.o. male MRN: [id number]  Unit/Bed#: ED 26 Encounter: [id number]    History of Present Illness     HPI:  Demetri Miller is a 48 y.o. male who presents with complaints of painful left scrotum that began Wednesday of this past week. Patient states over the next few days pain increased in severity and he had intermittent fevers. Pain similar to pain with prior abscesses. Patient recently had right scrotal abscess that was treated with admission and Bedside I&D in late April. He did complete a course of antibiotics and followed up 5/10/24 in the urology office. Patient also reports a remote history of left scrotal abscess approximately 6 years ago. He denies any recent injury or skin tear in the scrotum. He works in an [employer] and is active. He  has a family history of DM and last Hemoglobin A1c 9.6 from 4/25/24.    Inpatient consult to Urology  Consult performed by: Chanel Morgan PA-C  Consult ordered by: Fly Flanagan DO  Reason for consult: left scrotal abscess  Assessment/Recommendations: Left scrotal abscess with history of recent right scrotal abscess          Review of Systems   Constitutional:  Positive for chills and fever. Negative for fatigue.   HENT:  Negative for congestion, ear pain, hearing loss, postnasal drip, sinus pressure and sore throat.    Eyes:  Negative for pain, redness and visual disturbance.   Respiratory:  Negative for cough, chest tightness, shortness of breath and wheezing.    Cardiovascular:  Negative for chest pain, palpitations and leg swelling.   Gastrointestinal:  Negative for abdominal distention, abdominal pain, constipation, diarrhea, nausea and vomiting.   Genitourinary:  Positive for scrotal swelling and testicular pain. Negative for difficulty urinating, dysuria, frequency, hematuria and urgency.   Musculoskeletal:  Negative for arthralgias, back pain, joint swelling and myalgias.   Skin:  Positive for  color change. Negative for rash and wound.   Neurological:  Negative for dizziness, weakness, numbness and headaches.   Hematological:  Negative for adenopathy.   Psychiatric/Behavioral:  Negative for dysphoric mood. The patient is not nervous/anxious.    All other systems reviewed and are negative.        Historical Information   Past Medical History:   Diagnosis Date    Diabetes mellitus (HCC)     prediabetic     Psoriasis      Past Surgical History:   Procedure Laterality Date    INCISION AND DRAINAGE  4/26/2024     Social History   Social History     Substance and Sexual Activity   Alcohol Use Yes    Comment: rarely      Social History     Substance and Sexual Activity   Drug Use Never     Social History     Tobacco Use   Smoking Status Every Day    Current packs/day: 0.50    Types: Cigarettes   Smokeless Tobacco Never     Family History:   Family History   Problem Relation Age of Onset    No Known Problems Father     No Known Problems Mother        Meds/Allergies   Current Facility-Administered Medications   Medication Dose Route Frequency    midazolam (VERSED) injection 2 mg  2 mg Intravenous Once     Not in a hospital admission.  Allergies   Allergen Reactions    Aspirin Anaphylaxis    Penicillins Anaphylaxis       Objective   First Vitals:   Blood Pressure: (!) 203/87 (05/26/24 1408)  Pulse: (!) 106 (05/26/24 1408)  Temperature: 97.8 °F (36.6 °C) (05/26/24 1408)  Temp Source: Oral (05/26/24 1408)  Respirations: 20 (05/26/24 1408)  SpO2: 98 % (05/26/24 1408)    Current Vitals:   Blood Pressure: 153/75 (05/26/24 1600)  Pulse: 88 (05/26/24 1600)  Temperature: 97.8 °F (36.6 °C) (05/26/24 1408)  Temp Source: Oral (05/26/24 1408)  Respirations: 16 (05/26/24 1600)  SpO2: 97 % (05/26/24 1600)    No intake or output data in the 24 hours ending 05/26/24 1702    Invasive Devices       Peripheral Intravenous Line  Duration             Peripheral IV 05/26/24 Right Hand <1 day                    Physical  Exam  Constitutional:       Appearance: Normal appearance. He is well-developed.   HENT:      Head: Normocephalic and atraumatic.      Mouth/Throat:      Mouth: Oropharynx is clear and moist.   Eyes:      Extraocular Movements: EOM normal.      Pupils: Pupils are equal, round, and reactive to light.   Cardiovascular:      Rate and Rhythm: Normal rate and regular rhythm.      Heart sounds: No murmur heard.  Pulmonary:      Breath sounds: Normal breath sounds. No wheezing or rales.   Abdominal:      General: Bowel sounds are normal. There is no distension.      Palpations: Abdomen is soft.      Tenderness: There is no abdominal tenderness. There is no guarding or rebound.   Genitourinary:     Comments: Left scrotum tender to palpation, warm, erythematous, firm   Musculoskeletal:         General: No edema. Normal range of motion.      Cervical back: Neck supple.   Lymphadenopathy:      Cervical: No cervical adenopathy.   Skin:     General: Skin is warm.      Findings: Erythema present. No rash.   Neurological:      Mental Status: He is alert and oriented to person, place, and time.      Cranial Nerves: No cranial nerve deficit.   Psychiatric:         Mood and Affect: Mood normal.           Lab Results:   Admission on 05/26/2024   Component Date Value    WBC 05/26/2024 10.10     RBC 05/26/2024 5.03     Hemoglobin 05/26/2024 15.0     Hematocrit 05/26/2024 43.7     MCV 05/26/2024 87     MCH 05/26/2024 29.8     MCHC 05/26/2024 34.3     RDW 05/26/2024 11.8     MPV 05/26/2024 9.8     Platelets 05/26/2024 207     nRBC 05/26/2024 0     Segmented % 05/26/2024 76 (H)     Immature Grans % 05/26/2024 0     Lymphocytes % 05/26/2024 15     Monocytes % 05/26/2024 9     Eosinophils Relative 05/26/2024 0     Basophils Relative 05/26/2024 0     Absolute Neutrophils 05/26/2024 7.63 (H)     Absolute Immature Grans 05/26/2024 0.04     Absolute Lymphocytes 05/26/2024 1.46     Absolute Monocytes 05/26/2024 0.92     Eosinophils Absolute  05/26/2024 0.03     Basophils Absolute 05/26/2024 0.02     Sodium 05/26/2024 135     Potassium 05/26/2024 3.5     Chloride 05/26/2024 101     CO2 05/26/2024 25     ANION GAP 05/26/2024 9     BUN 05/26/2024 8     Creatinine 05/26/2024 0.55 (L)     Glucose 05/26/2024 177 (H)     Calcium 05/26/2024 8.8     AST 05/26/2024 15     ALT 05/26/2024 20     Alkaline Phosphatase 05/26/2024 92     Total Protein 05/26/2024 6.9     Albumin 05/26/2024 3.9     Total Bilirubin 05/26/2024 1.46 (H)     eGFR 05/26/2024 122     LACTIC ACID 05/26/2024 1.0     Procalcitonin 05/26/2024 <0.05     Protime 05/26/2024 13.4     INR 05/26/2024 1.03     PTT 05/26/2024 25     Ventricular Rate 05/26/2024 97     Atrial Rate 05/26/2024 97     [place] Interval 05/26/2024 142     QRSD Interval 05/26/2024 86     QT Interval 05/26/2024 334     QTC Interval 05/26/2024 424     P Axis 05/26/2024 58     QRS [place] 05/26/2024 83     T Wave Axis 05/26/2024 40      Imaging: left scrotal ulatrsound 5/26/24 - Left-sided scrotal wall thickening and edema with 5 cm echogenic collection with peripheral vascularity suspicious for an       Assessment & Plan   Left scrotal abscess with history of recent right sided scrotal abscess. Patient to be admitted to medicine service. Patient will be evaluated in the am to determine if bedside I&D will be performed vs I&D in the operating room with washout. Antibiotics, pain control, npo after midnight.     Counseling / Coordination of Care  Total floor / unit time spent today 30 minutes  Greater than 50% of total time was spent with the patient and / or family counseling and / or coordination of care.

## 2024-05-26 NOTE — H&P
"      INTERNAL MEDICINE RESIDENCY ADMISSION H&P     Name: Demetri Miller   Age & Sex: 48 y.o. male   MRN: [id number]  Unit/Bed#: ED 26   Encounter: [id number]  Primary Care Provider: No primary care provider on file.    Code Status: Level 1 - Full Code  Admission Status: INPATIENT   Disposition: Patient requires Med/Surg    Admit to team: SOD Team A    ASSESSMENT/PLAN     Active Problems:    Scrotal abscess    DM (diabetes mellitus) (HCC)    Elevated BP without diagnosis of hypertension      Elevated BP without diagnosis of hypertension  Assessment & Plan  BP on admission 203/87 improved to 153/75   2 out of 2 setting of pain.    BP less than 160 while inpatient  Continue to trend BP if antihypertensive is needed while inpatient    DM (diabetes mellitus) (HCC)  Assessment & Plan  Lab Results   Component Value Date    HGBA1C 9.6 (H) 04/25/2024       No results for input(s): \"POCGLU\" in the last 72 hours.    Blood Sugar Average: Last 72 hrs:    New diagnosis DM  Initiated on Lantus 6 units with sliding scale insulin algorithm to; can consider mealtime insulin after 24 hours.  Will need diabetic education  Goal glucose 140-160    Scrotal abscess  Assessment & Plan  Recent hospital admission 4/25 for right-sided scrotal abscess status post I&D by urology at that time.  Now with recurrence and worsening signs and symptoms over the past week with intermittent fevers.  Not meeting sepsis criteria on admission.  Status post Bactrim x 1 in ED.    Previous cultures on last admission positive for beta-hemolytic strep.  A1c that resulted after admission of 9.6 with no follow-up with PCP/establishment of PCP.    Urology on board, n.p.o. at midnight for possible urology bedside versus OR I&D  Bactrim for now, would be preferable to add amoxicillin/cephalosporin however patient does confirm anaphylaxis to penicillin  Pain regimen: Tylenol 975 every 8 scheduled; oxycodone 2.5/5mg prn mod/severe; dilaudid 0.2 " breakthrough  Controlled DM as below  F/U UA        VTE Pharmacologic Prophylaxis: Enoxaparin (Lovenox)  VTE Mechanical Prophylaxis: sequential compression device    CHIEF COMPLAINT     Chief Complaint   Patient presents with    Cyst     Presents to ER with cyst on left testicle, had one on right x1 month ago that was drained bedside. Was seen at [facility] today      HISTORY OF PRESENT ILLNESS     Patient is a 48-year-old male with a past medical history of diabetes (A1c 9.6 April 2024) who is here for worsening left testicular pain and swelling for the past 3 days.    Patient was recently admitted in April for right-sided testicular pain and swelling seen by urology at that time who performed bedside I&D 4/26.  Patient was treated with Bactrim.  Wound cultures resulted as beta-hemolytic strep group B, Peptostreptococcus.  Patient was discharged on Bactrim for 6-day course.  Seen for follow-up appointment 5/10 with noted improvements of wound site and improved infection.      At that time, hemoglobin A1c ordered and resulted after discharge at 9.6.  Patient did not follow-up with the PCP for TCM.    Patient states significant pain on the left side of the testicle that is relieved by elevating his left leg/shifting in bed.  Significant pain to touch and states no bruising or drainage.  Denies any recent trauma.  Denies any recent sexual activity.  Had subjective fevers and chills in the week leading up to hospitalization.  Currently complaining of 5-6 out of 10 pain at rest that worsens with touching testicles.    In the ED, afebrile, , /87, Saturating well on room air.  Pertinent labs glucose 177, WBC 10.10, ANC 7.63.  UA pending.  Blood cultures 2 of 2 pending.  Patient given lidocaine cream, 1 L normal saline x 1, Bactrim in ED.    REVIEW OF SYSTEMS     Review of Systems   Constitutional:  Positive for fever. Negative for chills.   Respiratory:  Negative for cough, chest tightness and shortness of breath.     Cardiovascular:  Negative for chest pain and leg swelling.   Gastrointestinal: Negative.    Genitourinary:  Positive for scrotal swelling and testicular pain.   Skin:  Positive for rash and wound.   Neurological:  Negative for dizziness.   Psychiatric/Behavioral: Negative.       OBJECTIVE     Vitals:    24 1408 24 1600   BP: (!) 203/87 153/75   BP Location: Left arm Left arm   Pulse: (!) 106 88   Resp: 20 16   Temp: 97.8 °F (36.6 °C)    TempSrc: Oral    SpO2: 98% 97%      Temperature:   Temp (24hrs), Av.4 °F (36.9 °C), Min:97.8 °F (36.6 °C), Max:99 °F (37.2 °C)    Temperature: 97.8 °F (36.6 °C)  Intake & Output:  I/O          0701   0700  0701   0700  07 0700    IV Piggyback   1000    Total Intake   1000    Net   +1000                 Weights:        There is no height or weight on file to calculate BMI.  Weight (last 2 days)       None          Physical Exam  Exam conducted with a chaperone present (PT father).   Constitutional:       General: He is not in acute distress.     Appearance: He is not ill-appearing or diaphoretic.   HENT:      Mouth/Throat:      Mouth: Mucous membranes are moist.      Pharynx: Oropharynx is clear.   Eyes:      General: No scleral icterus.  Cardiovascular:      Rate and Rhythm: Normal rate and regular rhythm.      Heart sounds: No murmur heard.  Pulmonary:      Effort: Pulmonary effort is normal.      Breath sounds: Normal breath sounds. No wheezing, rhonchi or rales.   Abdominal:      General: Abdomen is flat. Bowel sounds are normal.      Tenderness: There is no guarding.   Genitourinary:     Testes:         Right: Tenderness and swelling present.         Left: Tenderness and swelling present.   Musculoskeletal:      Cervical back: Neck supple.      Right lower leg: No edema.      Left lower leg: No edema.   Skin:     General: Skin is warm and dry.   Neurological:      Mental Status: He is alert and oriented to person, place, and  time. Mental status is at baseline.   Psychiatric:         Mood and Affect: Mood normal.         Behavior: Behavior normal.         Thought Content: Thought content normal.         Judgment: Judgment normal.       PAST MEDICAL HISTORY     Past Medical History:   Diagnosis Date    Diabetes mellitus (HCC)     prediabetic     Psoriasis      PAST SURGICAL HISTORY     Past Surgical History:   Procedure Laterality Date    INCISION AND DRAINAGE  4/26/2024     SOCIAL & FAMILY HISTORY     Social History     Substance and Sexual Activity   Alcohol Use Yes    Comment: rarely        Social History     Substance and Sexual Activity   Drug Use Never     Social History     Tobacco Use   Smoking Status Every Day    Current packs/day: 0.50    Types: Cigarettes   Smokeless Tobacco Never     Family History   Problem Relation Age of Onset    No Known Problems Father     No Known Problems Mother      LABORATORY DATA     Labs: I have personally reviewed pertinent reports.    Results from last 7 days   Lab Units 05/26/24  1540   WBC Thousand/uL 10.10   HEMOGLOBIN g/dL 15.0   HEMATOCRIT % 43.7   PLATELETS Thousands/uL 207   SEGS PCT % 76*   MONO PCT % 9   EOS PCT % 0      Results from last 7 days   Lab Units 05/26/24  1540   POTASSIUM mmol/L 3.5   CHLORIDE mmol/L 101   CO2 mmol/L 25   BUN mg/dL 8   CREATININE mg/dL 0.55*   CALCIUM mg/dL 8.8   ALK PHOS U/L 92   ALT U/L 20   AST U/L 15              Results from last 7 days   Lab Units 05/26/24  1540   INR  1.03   PTT seconds 25     Results from last 7 days   Lab Units 05/26/24  1540   LACTIC ACID mmol/L 1.0         Micro:  Lab Results   Component Value Date    BLOODCX No Growth After 5 Days. 04/25/2024    BLOODCX No Growth After 5 Days. 04/25/2024    WOUNDCULT 3+ Growth of Beta Hemolytic Streptococcus Group B (A) 04/26/2024    WOUNDCULT (A) 04/26/2024     Growth in Broth culture only Peptostreptoccus anaerobius    WOUNDCULT 2+ Growth of Beta Hemolytic Streptococcus Group B (A) 07/20/2019     WOUNDCULT Growth in Broth culture only Prevotella bivia (A) 07/20/2019    WOUNDCULT Few Colonies of 07/20/2019     IMAGING & DIAGNOSTIC TESTS     Imaging: I have personally reviewed pertinent reports.    US scrotum and testicles    Result Date: 5/26/2024  Impression: Left-sided scrotal wall thickening and edema with 5 cm echogenic collection with peripheral vascularity suspicious for an abscess. The study was marked in EPIC for immediate notification. Workstation performed: [id number]     EKG, Pathology, and Other Studies: I have personally reviewed pertinent reports.     ALLERGIES     Allergies   Allergen Reactions    Aspirin Anaphylaxis    Penicillins Anaphylaxis     MEDICATIONS PRIOR TO ARRIVAL     Prior to Admission medications    Medication Sig Start Date End Date Taking? Authorizing Provider   acetaminophen (Tylenol) 325 mg tablet Take by mouth   Yes Historical Provider, MD     MEDICATIONS ADMINISTERED IN LAST 24 HOURS     Medication Administration - last 24 hours from 05/25/2024 1814 to 05/26/2024 1814         Date/Time Order Dose Route Action Action by     05/26/2024 1534 EDT lidocaine (LMX) 4 % cream -- Topical Given Bhumika Rios RN     05/26/2024 1514 EDT midazolam (VERSED) injection 2 mg 0 mg Intravenous Hold Bhumika Rios RN     05/26/2024 1535 EDT lidocaine (PF) (XYLOCAINE-MPF) 1 % injection 10 mL 10 mL Infiltration Given by Other Bhumika Rios RN     05/26/2024 1639 EDT sodium chloride 0.9 % bolus 1,000 mL 0 mL Intravenous Stopped Ashlie Morgan RN     05/26/2024 1539 EDT sodium chloride 0.9 % bolus 1,000 mL 1,000 mL Intravenous New Bag Bhumika Rios RN     05/26/2024 1721 EDT sulfamethoxazole-trimethoprim (BACTRIM DS) 800-160 mg per tablet 1 tablet 1 tablet Oral Given Ashlie Morgan RN          CURRENT MEDICATIONS     Current Facility-Administered Medications   Medication Dose Route Frequency Provider Last Rate    acetaminophen  975 mg Oral Q8H [facility] Maria Victoria Ashley DO      [START ON 5/27/2024]  "enoxaparin  40 mg Subcutaneous Daily Maria Victoria Ashley DO      HYDROmorphone  0.2 mg Intravenous Q2H PRN Maria Victoria Ashley DO      insulin glargine  6 Units Subcutaneous HS Maria Victoria Ashley DO      insulin lispro  1-5 Units Subcutaneous 4x Daily (AC & HS) Maria Victoria Ashley DO      oxyCODONE  2.5 mg Oral Q4H PRN Maria Victoria Ashley DO      Or    oxyCODONE  5 mg Oral Q4H PRN Maria Victoria Ashley DO      [START ON 5/27/2024] sulfamethoxazole-trimethoprim  2 tablet Oral Q12H [facility] Maria Victoria Ashley DO          HYDROmorphone, 0.2 mg, Q2H PRN  oxyCODONE, 2.5 mg, Q4H PRN   Or  oxyCODONE, 5 mg, Q4H PRN        Admission Time  I spent 30 minutes admitting the patient.  This involved direct patient contact where I performed a full history and physical, reviewing previous records, and reviewing laboratory and other diagnostic studies.    Portions of the record may have been created with voice recognition software.  Occasional wrong word or \"sound a like\" substitutions may have occurred due to the inherent limitations of voice recognition software.  Read the chart carefully and recognize, using context, where substitutions have occurred.    ==  Maria Victoria Ashley DO,   [facility]  Internal Medicine Residency, PGY-1    "

## 2024-05-26 NOTE — ASSESSMENT & PLAN NOTE
"Lab Results   Component Value Date    HGBA1C 9.6 (H) 04/25/2024       No results for input(s): \"POCGLU\" in the last 72 hours.    History of diabetes per chart review however previous A1c is unknown prior to April/does not follow-up with physicians/PCP.    Blood Sugar Average: Last 72 hrs:    Initiated on Lantus 6 units with sliding scale insulin algorithm. Patient will also be taking Metformin 500mg once a day for 14 days then 2x500 mg for 14 days. Patient received insulin and diabetic education upon discharge.   "

## 2024-05-26 NOTE — ED PROVIDER NOTES
"History  Chief Complaint   Patient presents with    Cyst     Presents to ER with cyst on left testicle, had one on right x1 month ago that was drained bedside. Was seen at [facility] today     HPI    Patient is a 48-year-old male with past medical history of diabetes, psoriasis presenting for evaluation of left scrotal abscess.  Patient had similar abscess last month which was drained by urology, states over the past few days it is returned and become larger and more painful.  He states he has been feeling \"hot\" but is not sure if he is been having fevers, denies chills.  Denies any urinary symptoms. Pt states he is \"pre-diabetic.\" A1C on 4/25/24 9.6. Pt not on medications for it.     Prior to Admission Medications   Prescriptions Last Dose Informant Patient Reported? Taking?   acetaminophen (Tylenol) 325 mg tablet 5/26/2024 Self Yes Yes   Sig: Take by mouth      Facility-Administered Medications: None       Past Medical History:   Diagnosis Date    Diabetes mellitus (HCC)     prediabetic     Psoriasis        Past Surgical History:   Procedure Laterality Date    INCISION AND DRAINAGE  4/26/2024       Family History   Problem Relation Age of Onset    No Known Problems Father     No Known Problems Mother      I have reviewed and agree with the history as documented.    E-Cigarette/Vaping    E-Cigarette Use Never User      E-Cigarette/Vaping Substances     Social History     Tobacco Use    Smoking status: Every Day     Current packs/day: 0.50     Types: Cigarettes    Smokeless tobacco: Never   Vaping Use    Vaping status: Never Used   Substance Use Topics    Alcohol use: Yes     Comment: rarely     Drug use: Never        Review of Systems   Constitutional:  Negative for chills and fever.   HENT:  Negative for ear pain and sore throat.    Eyes:  Negative for pain and visual disturbance.   Respiratory:  Negative for cough and shortness of breath.    Cardiovascular:  Negative for chest pain and palpitations.   Gastrointestinal:  " Negative for abdominal pain and vomiting.   Genitourinary:  Positive for scrotal swelling. Negative for dysuria and hematuria.   Musculoskeletal:  Negative for arthralgias and back pain.   Skin:  Negative for color change and rash.   Neurological:  Negative for seizures and syncope.   All other systems reviewed and are negative.      Physical Exam  ED Triage Vitals [05/26/24 1408]   Temperature Pulse Respirations Blood Pressure SpO2   97.8 °F (36.6 °C) (!) 106 20 (!) 203/87 98 %      Temp Source Heart Rate Source Patient Position - Orthostatic VS BP Location FiO2 (%)   Oral Monitor Sitting Left arm --      Pain Score       --             Orthostatic Vital Signs  Vitals:    05/26/24 1408 05/26/24 1600   BP: (!) 203/87 153/75   Pulse: (!) 106 88   Patient Position - Orthostatic VS: Sitting Sitting       Physical Exam  Vitals and nursing note reviewed.   Constitutional:       General: He is not in acute distress.     Appearance: He is well-developed. He is not toxic-appearing.   HENT:      Head: Normocephalic and atraumatic.      Right Ear: External ear normal.      Left Ear: External ear normal.      Nose: Nose normal.      Mouth/Throat:      Pharynx: Oropharynx is clear. No oropharyngeal exudate or posterior oropharyngeal erythema.   Eyes:      Extraocular Movements: Extraocular movements intact.      Conjunctiva/sclera: Conjunctivae normal.      Pupils: Pupils are equal, round, and reactive to light.   Cardiovascular:      Rate and Rhythm: Normal rate and regular rhythm.      Pulses: Normal pulses.      Heart sounds: Normal heart sounds. No murmur heard.     No friction rub. No gallop.   Pulmonary:      Effort: Pulmonary effort is normal. No respiratory distress.      Breath sounds: Normal breath sounds. No wheezing, rhonchi or rales.   Abdominal:      General: Abdomen is flat.      Palpations: Abdomen is soft.      Tenderness: There is no abdominal tenderness. There is no guarding or rebound.   Genitourinary:      Comments: Erythematous and markedly swollen left scrotum, firm, very tender to palpation.  Musculoskeletal:         General: Normal range of motion.      Cervical back: Normal range of motion. No rigidity.      Right lower leg: No edema.      Left lower leg: No edema.   Skin:     General: Skin is warm and dry.      Capillary Refill: Capillary refill takes less than 2 seconds.   Neurological:      General: No focal deficit present.      Mental Status: He is alert.   Psychiatric:         Mood and Affect: Mood normal.         ED Medications  Medications   lidocaine (LMX) 4 % cream ( Topical Given 5/26/24 1534)   lidocaine (PF) (XYLOCAINE-MPF) 1 % injection 10 mL (10 mL Infiltration Given by Other 5/26/24 1535)   sodium chloride 0.9 % bolus 1,000 mL (0 mL Intravenous Stopped 5/26/24 1639)   sulfamethoxazole-trimethoprim (BACTRIM DS) 800-160 mg per tablet 1 tablet (1 tablet Oral Given 5/26/24 1721)       Diagnostic Studies  Results Reviewed       Procedure Component Value Units Date/Time    Procalcitonin [[id number]]  (Normal) Collected: 05/26/24 1540    Lab Status: Final result Specimen: Blood from Arm, Left Updated: 05/26/24 1614     Procalcitonin <0.05 ng/ml     Lactic acid [[id number]]  (Normal) Collected: 05/26/24 1540    Lab Status: Final result Specimen: Blood from Arm, Left Updated: 05/26/24 1608     LACTIC ACID 1.0 mmol/L     Narrative:      Result may be elevated if tourniquet was used during collection.    Comprehensive metabolic panel [[id number]]  (Abnormal) Collected: 05/26/24 1540    Lab Status: Final result Specimen: Blood from Arm, Left Updated: 05/26/24 1606     Sodium 135 mmol/L      Potassium 3.5 mmol/L      Chloride 101 mmol/L      CO2 25 mmol/L      ANION GAP 9 mmol/L      BUN 8 mg/dL      Creatinine 0.55 mg/dL      Glucose 177 mg/dL      Calcium 8.8 mg/dL      AST 15 U/L      ALT 20 U/L      Alkaline Phosphatase 92 U/L      Total Protein 6.9 g/dL      Albumin 3.9 g/dL      Total Bilirubin 1.46 mg/dL       eGFR 122 ml/min/1.73sq m     Narrative:      National Kid[employer] Foundation guidelines for Chronic Kidney Disease (CKD):     Stage 1 with normal or high GFR (GFR > 90 mL/min/1.73 square meters)    Stage 2 Mild CKD (GFR = 60-89 mL/min/1.73 square meters)    Stage 3A Moderate CKD (GFR = 45-59 mL/min/1.73 square meters)    Stage 3B Moderate CKD (GFR = 30-44 mL/min/1.73 square meters)    Stage 4 Severe CKD (GFR = 15-29 mL/min/1.73 square meters)    Stage 5 End Stage CKD (GFR <15 mL/min/1.73 square meters)  Note: GFR calculation is accurate only with a steady state creatinine    Protime-INR [[id number]]  (Normal) Collected: 05/26/24 1540    Lab Status: Final result Specimen: Blood from Arm, Left Updated: 05/26/24 1559     Protime 13.4 seconds      INR 1.03    APTT [[id number]]  (Normal) Collected: 05/26/24 1540    Lab Status: Final result Specimen: Blood from Arm, Left Updated: 05/26/24 1559     PTT 25 seconds     CBC and differential [[id number]]  (Abnormal) Collected: 05/26/24 1540    Lab Status: Final result Specimen: Blood from Arm, Left Updated: 05/26/24 1549     WBC 10.10 Thousand/uL      RBC 5.03 Million/uL      Hemoglobin 15.0 g/dL      Hematocrit 43.7 %      MCV 87 fL      MCH 29.8 pg      MCHC 34.3 g/dL      RDW 11.8 %      MPV 9.8 fL      Platelets 207 Thousands/uL      nRBC 0 /100 WBCs      Segmented % 76 %      Immature Grans % 0 %      Lymphocytes % 15 %      Monocytes % 9 %      Eosinophils Relative 0 %      Basophils Relative 0 %      Absolute Neutrophils 7.63 Thousands/µL      Absolute Immature Grans 0.04 Thousand/uL      Absolute Lymphocytes 1.46 Thousands/µL      Absolute Monocytes 0.92 Thousand/µL      Eosinophils Absolute 0.03 Thousand/µL      Basophils Absolute 0.02 Thousands/µL     Blood culture #2 [[id number]] Collected: 05/26/24 1540    Lab Status: In process Specimen: Blood from Hand, Right Updated: 05/26/24 1544    Blood culture #1 [[id number]] Collected: 05/26/24 1540    Lab Status: In  process Specimen: Blood from Arm, Left Updated: 05/26/24 1544    UA w Reflex to Microscopic w Reflex to Culture [[id number]]     Lab Status: No result Specimen: Urine                    US scrotum and testicles   Final Result by Momo Vyas MD (05/26 0825)      Left-sided scrotal wall thickening and edema with 5 cm echogenic collection with peripheral vascularity suspicious for an abscess.         The study was marked in EPIC for immediate notification.      Workstation performed: [id number]         US bedside procedure   Final Result by Interface, Externalimages (05/26 3859)            Procedures  POC MSK/Soft Tissue US    Date/Time: 5/26/2024 3:24 PM    Performed by: Stevenson Gonsalez MD  Authorized by: Stevenson Gonsalez MD    Patient location:  ED  Procedure:     Performed: soft tissue ultrasound    Procedure details:     Exam Type:  Educational    Longitudinal view:  Obtained    Transverse view:  Obtained    Image quality: limited diagnostic      Image availability:  Images available in PACS  Soft tissue ultrasound:     Soft tissue indications: suspected abscess      Anatomic location:  Other (comment) (Left testicle/scrotum)    Soft tissue findings: subcutaneous collection (comment)    Interpretation:     Soft tissue impressions: consistent with abscess          ED Course  ED Course as of 05/26/24 1737   Sun May 26, 2024   1531 Tiger text urology sent at 3:21 PM   1539 ECG 12 lead  Procedure Note: EKG  Date/Time: 05/26/24 3:39 PM   Interpreted by: Stevenson Gonsalez MD  Indications / Diagnosis: Tachycardia  ECG reviewed by me, the ED Provider: yes   The EKG demonstrates:  Rhythm: normal sinus  Intervals: normal intervals  Axis: normal axis  QRS/Blocks: normal QRS  ST Changes: No acute ST Changes, no STD/QUETA.                                         Medical Decision Making  48-year-old male presenting for evaluation of scrotal abscess.  Patient hypertensive and tachycardic on arrival, will obtain septic workup  especially given the patient has uncontrolled diabetes.  Does not have appearance of an STI at this time.  Sepsis labs are all WNL. Bedside ultrasound revealing of large loculated fluid collections consistent with abscess formation.  Urology was consulted for further management. Recommend formal u/s, admission for I&D tomorrow. Pt agreeable with plan.     Amount and/or Complexity of Data Reviewed  Labs: ordered.  Radiology: ordered.  ECG/medicine tests:  Decision-making details documented in ED Course.    Risk  OTC drugs.  Prescription drug management.  Decision regarding hospitalization.          Disposition  Final diagnoses:   Scrotal abscess     Time reflects when diagnosis was documented in both MDM as applicable and the Disposition within this note       Time User Action Codes Description Comment    5/26/2024  3:21 PM Stevenson Gonsalez Add [N49.2] Scrotal abscess           ED Disposition       ED Disposition   Admit    Condition   Stable    Date/Time   Sun May 26, 2024  5:08 PM    Comment   Case was discussed with SOD and the patient's admission status was agreed to be Admission Status: inpatient status to the service of SOD .               Follow-up Information    None         Patient's Medications   Discharge Prescriptions    No medications on file     No discharge procedures on file.    PDMP Review       None             ED Provider  Attending physically available and evaluated Demetri Miller. I managed the patient along with the ED Attending.    Electronically Signed by           Stevenson Gonsalez MD  05/26/24 8885

## 2024-05-26 NOTE — ED NOTES
"Er staff attempted to insert and IV and get blood work. Pt reported, \" You are not working me up here. If I am going to be transferred my friend is going to drive me instead. Last time I got charge for the workup here and the ambulance ride. I am not doing that again.\" Provider aware and at bedside.      Radha Padilla RN  05/26/24 5356    "

## 2024-05-26 NOTE — ASSESSMENT & PLAN NOTE
Recent hospital admission 4/25 for right-sided scrotal abscess status post I&D by urology at that time.  Now with recurrence and worsening signs and symptoms over the past week with intermittent fevers.  Not meeting sepsis criteria on admission.  Status post Bactrim x 1 in ED.    Previous cultures on last admission positive for beta-hemolytic strep.  A1c that resulted after admission of 9.6 with no follow-up with PCP/establishment of PCP.    Urology was consulted and patient underwent I&D of scrotal abscess at the left posterior aspect of the hemiscrotum almost in the perineum with gross pus.     Patient will be discharged on Bactrim to cover for MRSA and Cefuroxime to cover for Strep. Patient will be notified when cultures return and if any changed in abx course need to be made. Patient will take Tylenol for pain management as needed and follow up with PCP and Urology.

## 2024-05-26 NOTE — ED PROVIDER NOTES
History  Chief Complaint   Patient presents with    Cyst     Patient presents to the ER with a cyst on his testicle.     48-year-old male with a history of right-sided scrotal abscess recently requiring I&D by urology presents for evaluation of left-sided scrotal swelling that started 5-6 days ago but states it got worse last night.  Associated pain and small amount of purulent drainage.  Patient reports feels similar to abscess on the right side.        Prior to Admission Medications   Prescriptions Last Dose Informant Patient Reported? Taking?   acetaminophen (Tylenol) 325 mg tablet  Self Yes No   Sig: Take by mouth      Facility-Administered Medications: None       Past Medical History:   Diagnosis Date    Diabetes mellitus (HCC)     prediabetic     Psoriasis        Past Surgical History:   Procedure Laterality Date    INCISION AND DRAINAGE  4/26/2024       Family History   Problem Relation Age of Onset    No Known Problems Father     No Known Problems Mother      I have reviewed and agree with the history as documented.    E-Cigarette/Vaping    E-Cigarette Use Never User      E-Cigarette/Vaping Substances     Social History     Tobacco Use    Smoking status: Every Day     Current packs/day: 0.50     Types: Cigarettes    Smokeless tobacco: Never   Vaping Use    Vaping status: Never Used   Substance Use Topics    Alcohol use: Yes     Comment: rarely     Drug use: Never       Review of Systems   Genitourinary:  Positive for scrotal swelling.       Physical Exam  Physical Exam  Vitals and nursing note reviewed.   Constitutional:       General: He is not in acute distress.     Appearance: He is well-developed.   HENT:      Head: Normocephalic and atraumatic.      Right Ear: External ear normal.      Left Ear: External ear normal.      Nose: Nose normal.   Eyes:      General: No scleral icterus.  Cardiovascular:      Rate and Rhythm: Tachycardia present.   Pulmonary:      Effort: Pulmonary effort is normal. No  respiratory distress.   Abdominal:      General: There is no distension.      Palpations: Abdomen is soft.   Genitourinary:     Penis: Normal.       Comments: Large area of induration, erythema and swelling to left scrotum.  Small wound from recent I&D on right scrotum  Musculoskeletal:         General: No deformity. Normal range of motion.      Cervical back: Normal range of motion and neck supple.   Skin:     General: Skin is warm.      Findings: No rash.   Neurological:      General: No focal deficit present.      Mental Status: He is alert.      Gait: Gait normal.   Psychiatric:         Mood and Affect: Mood normal.         Vital Signs  ED Triage Vitals [05/26/24 1302]   Temperature Pulse Respirations Blood Pressure SpO2   99 °F (37.2 °C) (!) 121 18 (!) 167/101 97 %      Temp Source Heart Rate Source Patient Position - Orthostatic VS BP Location FiO2 (%)   Oral Monitor Standing Right arm --      Pain Score       6           Vitals:    05/26/24 1302 05/26/24 1312   BP: (!) 167/101 (!) 193/96   Pulse: (!) 121 (!) 115   Patient Position - Orthostatic VS: Standing Lying         Visual Acuity      ED Medications  Medications - No data to display    Diagnostic Studies  Results Reviewed       Procedure Component Value Units Date/Time    CBC and differential [[id number]]     Lab Status: No result Specimen: Blood     Comprehensive metabolic panel [[id number]]     Lab Status: No result Specimen: Blood     Lactic acid [[id number]]     Lab Status: No result Specimen: Blood     Procalcitonin [[id number]]     Lab Status: No result Specimen: Blood     Protime-INR [[id number]]     Lab Status: No result Specimen: Blood     APTT [[id number]]     Lab Status: No result Specimen: Blood     Blood culture #1 [[id number]]     Lab Status: No result Specimen: Blood     Blood culture #2 [[id number]]     Lab Status: No result Specimen: Blood     UA w Reflex to Microscopic w Reflex to Culture [[id number]]     Lab Status: No result Specimen: Urine      HS Troponin 0hr (reflex protocol) [[id number]]     Lab Status: No result Specimen: Blood                    No orders to display              Procedures  Procedures         ED Course                               SBIRT 22yo+      Flowsheet Row Most Recent Value   Initial Alcohol Screen: US AUDIT-C     1. How often do you have a drink containing alcohol? 0 Filed at: 05/26/2024 1314   2. How many drinks containing alcohol do you have on a typical day you are drinking?  0 Filed at: 05/26/2024 1314   3a. Male UNDER 65: How often do you have five or more drinks on one occasion? 0 Filed at: 05/26/2024 1314   3b. FEMALE Any Age, or MALE 65+: How often do you have 4 or more drinks on one occassion? 0 Filed at: 05/26/2024 1314   Audit-C Score 0 Filed at: 05/26/2024 1314   MARICRUZ: How many times in the past year have you...    Used an illegal drug or used a prescription medication for non-medical reasons? Never Filed at: 05/26/2024 1314                      Medical Decision Making  48-year-old male with left-sided scrotal swelling that started a few days ago concerning for abscess/infection.  Plan to obtain sepsis evaluation, ultrasound and urology consulted.    Patient made aware that he will require transfer for urology evaluation.  Patient requesting to be discharged as his previous visit required 20-hour stay at [facility] ED until transfer occurred.  He reports he was charged for the ambulance ride and is still paying the bill.  Patient declining medical evaluation at this time and would prefer to go straight to [facility].  Urology made aware that patient is leaving here to go to [facility].    Amount and/or Complexity of Data Reviewed  Labs: ordered.             Disposition  Final diagnoses:   Scrotal abscess     Time reflects when diagnosis was documented in both MDM as applicable and the Disposition within this note       Time User Action Codes Description Comment    5/26/2024  1:15 PM Godwin Cesar Add [N49.2] Scrotal  abscess           ED Disposition       ED Disposition   Discharge    Condition   Stable    Date/Time   Sun May 26, 2024 1326    Comment   Demetri Miller should be transferred out to [facility].               Follow-up Information       Follow up With Specialties Details Why Contact Info Additional Information    [facility] Emergency Department Emergency Medicine Today  [address] [address] [phone] [facility], [address], [place], [address] [phone]            Discharge Medication List as of 5/26/2024  1:27 PM        CONTINUE these medications which have NOT CHANGED    Details   acetaminophen (Tylenol) 325 mg tablet Take by mouth, Historical Med             No discharge procedures on file.    PDMP Review       None            ED Provider  Electronically Signed by             Godwin Cesar DO  05/26/24 4827

## 2024-05-26 NOTE — PROGRESS NOTES
INTERNAL MEDICINE RESIDENCY SENIOR ADMISSION NOTE     Name: Demetri Miller   Age & Sex: 48 y.o. male   MRN: [id number]  Unit/Bed#: ED 26   Encounter: [id number]  Primary Care Provider: No primary care provider on file.    Admit to team: SOD Team A    Patient seen and examined. Reviewed H&P per Dr. Ashley . Agree with the assessment and plan with any exception/addition as noted below:    Assessment     Active Problems:    Scrotal abscess    DM (diabetes mellitus) (HCC)    Elevated BP without diagnosis of hypertension    Recurrent scrotal abscess on the other side likely in setting of untreated diabetes, prior history and recent I&D    Prior culture from scrotal abscess I&D last month with Group B Beta hemolytic streptococcus sensitive to penicillin noted    Plan  Bactrim DS every 12 hours  Pain control as ordered  DVT prophylaxis  Urology consult; appreciate recommendation  NPO midnight for possible I&D tomorrow  Insulin glargine 6 units HS with sliding scale coverage  Monitor BP and start anti-hypertensive if indicated after adequate pain control achieved    Code Status: Level 1 - Full Code  Admission Status: INPATIENT   Disposition: Patient requires Med/Surg  Expected Length of Stay: > 2 MN    David Stewart MD  PGY-2, Internal Medicine  [facility]

## 2024-05-27 ENCOUNTER — ANESTHESIA (INPATIENT)
Dept: PERIOP | Facility: HOSPITAL | Age: 49
DRG: 728 | End: 2024-05-27
Payer: COMMERCIAL

## 2024-05-27 ENCOUNTER — ANESTHESIA EVENT (INPATIENT)
Dept: PERIOP | Facility: HOSPITAL | Age: 49
DRG: 728 | End: 2024-05-27
Payer: COMMERCIAL

## 2024-05-27 PROBLEM — Z72.0 TOBACCO USE: Status: ACTIVE | Noted: 2024-05-27

## 2024-05-27 LAB
ANION GAP SERPL CALCULATED.3IONS-SCNC: 12 MMOL/L (ref 4–13)
ATRIAL RATE: 97 BPM
BASOPHILS # BLD AUTO: 0.02 THOUSANDS/ÂΜL (ref 0–0.1)
BASOPHILS NFR BLD AUTO: 0 % (ref 0–1)
BUN SERPL-MCNC: 8 MG/DL (ref 5–25)
CALCIUM SERPL-MCNC: 8.6 MG/DL (ref 8.4–10.2)
CHLORIDE SERPL-SCNC: 103 MMOL/L (ref 96–108)
CO2 SERPL-SCNC: 24 MMOL/L (ref 21–32)
CREAT SERPL-MCNC: 0.59 MG/DL (ref 0.6–1.3)
EOSINOPHIL # BLD AUTO: 0.08 THOUSAND/ÂΜL (ref 0–0.61)
EOSINOPHIL NFR BLD AUTO: 1 % (ref 0–6)
ERYTHROCYTE [DISTWIDTH] IN BLOOD BY AUTOMATED COUNT: 11.9 % (ref 11.6–15.1)
GFR SERPL CREATININE-BSD FRML MDRD: 118 ML/MIN/1.73SQ M
GLUCOSE SERPL-MCNC: 111 MG/DL (ref 65–140)
GLUCOSE SERPL-MCNC: 112 MG/DL (ref 65–140)
GLUCOSE SERPL-MCNC: 130 MG/DL (ref 65–140)
GLUCOSE SERPL-MCNC: 137 MG/DL (ref 65–140)
GLUCOSE SERPL-MCNC: 142 MG/DL (ref 65–140)
GLUCOSE SERPL-MCNC: 156 MG/DL (ref 65–140)
GLUCOSE SERPL-MCNC: 204 MG/DL (ref 65–140)
HCT VFR BLD AUTO: 42.6 % (ref 36.5–49.3)
HGB BLD-MCNC: 14.5 G/DL (ref 12–17)
IMM GRANULOCYTES # BLD AUTO: 0.04 THOUSAND/UL (ref 0–0.2)
IMM GRANULOCYTES NFR BLD AUTO: 0 % (ref 0–2)
LYMPHOCYTES # BLD AUTO: 1.64 THOUSANDS/ÂΜL (ref 0.6–4.47)
LYMPHOCYTES NFR BLD AUTO: 15 % (ref 14–44)
MAGNESIUM SERPL-MCNC: 1.9 MG/DL (ref 1.9–2.7)
MCH RBC QN AUTO: 29.7 PG (ref 26.8–34.3)
MCHC RBC AUTO-ENTMCNC: 34 G/DL (ref 31.4–37.4)
MCV RBC AUTO: 87 FL (ref 82–98)
MONOCYTES # BLD AUTO: 1.04 THOUSAND/ÂΜL (ref 0.17–1.22)
MONOCYTES NFR BLD AUTO: 9 % (ref 4–12)
NEUTROPHILS # BLD AUTO: 8.2 THOUSANDS/ÂΜL (ref 1.85–7.62)
NEUTS SEG NFR BLD AUTO: 75 % (ref 43–75)
NRBC BLD AUTO-RTO: 0 /100 WBCS
P AXIS: 58 DEGREES
PLATELET # BLD AUTO: 210 THOUSANDS/UL (ref 149–390)
PMV BLD AUTO: 10.2 FL (ref 8.9–12.7)
POTASSIUM SERPL-SCNC: 3.4 MMOL/L (ref 3.5–5.3)
PR INTERVAL: 142 MS
QRS AXIS: 83 DEGREES
QRSD INTERVAL: 86 MS
QT INTERVAL: 334 MS
QTC INTERVAL: 424 MS
RBC # BLD AUTO: 4.88 MILLION/UL (ref 3.88–5.62)
SODIUM SERPL-SCNC: 139 MMOL/L (ref 135–147)
T WAVE AXIS: 40 DEGREES
VENTRICULAR RATE: 97 BPM
WBC # BLD AUTO: 11.02 THOUSAND/UL (ref 4.31–10.16)

## 2024-05-27 PROCEDURE — 93010 ELECTROCARDIOGRAM REPORT: CPT | Performed by: INTERNAL MEDICINE

## 2024-05-27 PROCEDURE — 87070 CULTURE OTHR SPECIMN AEROBIC: CPT | Performed by: UROLOGY

## 2024-05-27 PROCEDURE — 80048 BASIC METABOLIC PNL TOTAL CA: CPT

## 2024-05-27 PROCEDURE — 85025 COMPLETE CBC W/AUTO DIFF WBC: CPT

## 2024-05-27 PROCEDURE — 87077 CULTURE AEROBIC IDENTIFY: CPT | Performed by: UROLOGY

## 2024-05-27 PROCEDURE — 99024 POSTOP FOLLOW-UP VISIT: CPT | Performed by: UROLOGY

## 2024-05-27 PROCEDURE — 87075 CULTR BACTERIA EXCEPT BLOOD: CPT | Performed by: UROLOGY

## 2024-05-27 PROCEDURE — 55100 DRAINAGE OF SCROTUM ABSCESS: CPT | Performed by: UROLOGY

## 2024-05-27 PROCEDURE — 87185 SC STD ENZYME DETCJ PER NZM: CPT | Performed by: UROLOGY

## 2024-05-27 PROCEDURE — 0V953ZZ DRAINAGE OF SCROTUM, PERCUTANEOUS APPROACH: ICD-10-PCS | Performed by: UROLOGY

## 2024-05-27 PROCEDURE — 83735 ASSAY OF MAGNESIUM: CPT

## 2024-05-27 PROCEDURE — 87205 SMEAR GRAM STAIN: CPT | Performed by: UROLOGY

## 2024-05-27 PROCEDURE — 82948 REAGENT STRIP/BLOOD GLUCOSE: CPT

## 2024-05-27 RX ORDER — ONDANSETRON 2 MG/ML
INJECTION INTRAMUSCULAR; INTRAVENOUS AS NEEDED
Status: DISCONTINUED | OUTPATIENT
Start: 2024-05-27 | End: 2024-05-27

## 2024-05-27 RX ORDER — EPHEDRINE SULFATE 50 MG/ML
INJECTION INTRAVENOUS AS NEEDED
Status: DISCONTINUED | OUTPATIENT
Start: 2024-05-27 | End: 2024-05-27

## 2024-05-27 RX ORDER — FENTANYL CITRATE 50 UG/ML
INJECTION, SOLUTION INTRAMUSCULAR; INTRAVENOUS AS NEEDED
Status: DISCONTINUED | OUTPATIENT
Start: 2024-05-27 | End: 2024-05-27

## 2024-05-27 RX ORDER — ALBUMIN, HUMAN INJ 5% 5 %
12.5 SOLUTION INTRAVENOUS ONCE
Status: COMPLETED | OUTPATIENT
Start: 2024-05-27 | End: 2024-05-27

## 2024-05-27 RX ORDER — MIDAZOLAM HYDROCHLORIDE 2 MG/2ML
INJECTION, SOLUTION INTRAMUSCULAR; INTRAVENOUS AS NEEDED
Status: DISCONTINUED | OUTPATIENT
Start: 2024-05-27 | End: 2024-05-27

## 2024-05-27 RX ORDER — ONDANSETRON 2 MG/ML
4 INJECTION INTRAMUSCULAR; INTRAVENOUS ONCE AS NEEDED
Status: DISCONTINUED | OUTPATIENT
Start: 2024-05-27 | End: 2024-05-27 | Stop reason: HOSPADM

## 2024-05-27 RX ORDER — FENTANYL CITRATE/PF 50 MCG/ML
50 SYRINGE (ML) INJECTION
Status: DISCONTINUED | OUTPATIENT
Start: 2024-05-27 | End: 2024-05-27 | Stop reason: HOSPADM

## 2024-05-27 RX ORDER — SODIUM CHLORIDE, SODIUM LACTATE, POTASSIUM CHLORIDE, CALCIUM CHLORIDE 600; 310; 30; 20 MG/100ML; MG/100ML; MG/100ML; MG/100ML
INJECTION, SOLUTION INTRAVENOUS CONTINUOUS PRN
Status: DISCONTINUED | OUTPATIENT
Start: 2024-05-27 | End: 2024-05-27

## 2024-05-27 RX ORDER — PROPOFOL 10 MG/ML
INJECTION, EMULSION INTRAVENOUS AS NEEDED
Status: DISCONTINUED | OUTPATIENT
Start: 2024-05-27 | End: 2024-05-27

## 2024-05-27 RX ORDER — LIDOCAINE HYDROCHLORIDE 20 MG/ML
INJECTION, SOLUTION EPIDURAL; INFILTRATION; INTRACAUDAL; PERINEURAL AS NEEDED
Status: DISCONTINUED | OUTPATIENT
Start: 2024-05-27 | End: 2024-05-27

## 2024-05-27 RX ORDER — BUPIVACAINE HYDROCHLORIDE 5 MG/ML
INJECTION, SOLUTION EPIDURAL; INTRACAUDAL AS NEEDED
Status: DISCONTINUED | OUTPATIENT
Start: 2024-05-27 | End: 2024-05-27 | Stop reason: HOSPADM

## 2024-05-27 RX ORDER — CLINDAMYCIN PHOSPHATE 900 MG/50ML
INJECTION, SOLUTION INTRAVENOUS AS NEEDED
Status: DISCONTINUED | OUTPATIENT
Start: 2024-05-27 | End: 2024-05-27

## 2024-05-27 RX ADMIN — PROPOFOL 200 MG: 10 INJECTION, EMULSION INTRAVENOUS at 08:45

## 2024-05-27 RX ADMIN — EPHEDRINE SULFATE 5 MG: 50 INJECTION, SOLUTION INTRAVENOUS at 09:24

## 2024-05-27 RX ADMIN — ACETAMINOPHEN 975 MG: 325 TABLET, FILM COATED ORAL at 22:27

## 2024-05-27 RX ADMIN — INSULIN GLARGINE 6 UNITS: 100 INJECTION, SOLUTION SUBCUTANEOUS at 22:27

## 2024-05-27 RX ADMIN — ACETAMINOPHEN 975 MG: 325 TABLET, FILM COATED ORAL at 13:49

## 2024-05-27 RX ADMIN — SODIUM CHLORIDE 8 MCG: 9 INJECTION, SOLUTION INTRAVENOUS at 09:12

## 2024-05-27 RX ADMIN — FENTANYL CITRATE 25 MCG: 50 INJECTION INTRAMUSCULAR; INTRAVENOUS at 09:08

## 2024-05-27 RX ADMIN — MIDAZOLAM 2 MG: 1 INJECTION INTRAMUSCULAR; INTRAVENOUS at 08:36

## 2024-05-27 RX ADMIN — ONDANSETRON 4 MG: 2 INJECTION INTRAMUSCULAR; INTRAVENOUS at 08:36

## 2024-05-27 RX ADMIN — CLINDAMYCIN PHOSPHATE 900 MG: 900 INJECTION, SOLUTION INTRAVENOUS at 09:07

## 2024-05-27 RX ADMIN — SODIUM CHLORIDE 8 MCG: 9 INJECTION, SOLUTION INTRAVENOUS at 09:09

## 2024-05-27 RX ADMIN — ACETAMINOPHEN 975 MG: 325 TABLET, FILM COATED ORAL at 06:13

## 2024-05-27 RX ADMIN — FENTANYL CITRATE 25 MCG: 50 INJECTION INTRAMUSCULAR; INTRAVENOUS at 09:12

## 2024-05-27 RX ADMIN — LIDOCAINE HYDROCHLORIDE 100 MG: 20 INJECTION, SOLUTION EPIDURAL; INFILTRATION; INTRACAUDAL; PERINEURAL at 08:45

## 2024-05-27 RX ADMIN — SODIUM CHLORIDE, SODIUM LACTATE, POTASSIUM CHLORIDE, AND CALCIUM CHLORIDE: .6; .31; .03; .02 INJECTION, SOLUTION INTRAVENOUS at 08:38

## 2024-05-27 RX ADMIN — SULFAMETHOXAZOLE AND TRIMETHOPRIM 2 TABLET: 400; 80 TABLET ORAL at 22:26

## 2024-05-27 RX ADMIN — Medication 2.5 MG: at 22:28

## 2024-05-27 RX ADMIN — FENTANYL CITRATE 50 MCG: 50 INJECTION INTRAMUSCULAR; INTRAVENOUS at 08:42

## 2024-05-27 RX ADMIN — ALBUMIN (HUMAN) 12.5 G: 12.5 INJECTION, SOLUTION INTRAVENOUS at 10:12

## 2024-05-27 RX ADMIN — SULFAMETHOXAZOLE AND TRIMETHOPRIM 2 TABLET: 400; 80 TABLET ORAL at 06:13

## 2024-05-27 RX ADMIN — INSULIN LISPRO 1 UNITS: 100 INJECTION, SOLUTION INTRAVENOUS; SUBCUTANEOUS at 18:11

## 2024-05-27 NOTE — ANESTHESIA POSTPROCEDURE EVALUATION
Post-Op Assessment Note    CV Status:  Stable  Pain Score: 0    Pain management: adequate       Mental Status:  Sleepy   Hydration Status:  Euvolemic   PONV Controlled:  Controlled   Airway Patency:  Patent     Post Op Vitals Reviewed: Yes    No anethesia notable event occurred.    Staff: CRNA               BP   86/52   Temp   99.4   Pulse  67   Resp   12   SpO2   98

## 2024-05-27 NOTE — ASSESSMENT & PLAN NOTE
Patient originally diagnosed with scrotal abscess on 4/26 by Dr. Lira, initially doing well and seen on outpatient for wound check which revealed that it was healing on 5/10.  Patient now presenting due to left-sided scrotal pain which began on Wednesday and progressively worsened.    -Scrotal ultrasound revealing left-sided scrotal wall thickening and edema with 5 cm echogenic collection with peripheral vascularity suspicious for an abscess   -Physical examination revealingEdematous scrotum induration on the right scrotum majority of tenderness in the left scrotum with fluctuance appreciated, exam limited due to significant tenderness  -Discussed with patient moving forward with scrotal incision and drainage in the operating room today.  Discussed risk of procedure including bleeding, damage nearby structures and risk of recurrence.  Surgical consent was obtained and signed.  -Keep n.p.o.  -Proceed to operating room

## 2024-05-27 NOTE — ASSESSMENT & PLAN NOTE
1/2 per day past 20 years (10pack year hx). Does not want nicotine patches today. Interested in quitting.    Provide smoking cessation materials on dc  Discuss with PCP as outpt and est care

## 2024-05-27 NOTE — OP NOTE
OPERATIVE REPORT  PATIENT NAME: Demetri Miller    :  1975  MRN: [id number]  Pt Location: [facility] OR ROOM 07    SURGERY DATE: 2024    Surgeons and Role:     * Yung Maria MD - Primary    Preop Diagnosis:  Left sided Scrotal abscess [N49.2]    Post-Op Diagnosis Codes:     * Scrotal abscess [N49.2] Left sided measuring 5cm x 3xm x 4cm    Procedure(s):  Left - INCISION AND DRAINAGE LEFT SCROTUM    Specimen(s):  ID Type Source Tests Collected by Time Destination   A : LEFT SCROTAL ABSCESS Wound Perineum ANAEROBIC CULTURE AND GRAM STAIN, WOUND CULTURE Yung Maria MD 2024 0909        Estimated Blood Loss:   Minimal    Drains:  * No LDAs found *    Anesthesia Type:   General    Operative Indications:  Patient history reported controlled diabetes and psoriasis with recurrent scrotal abscesses with recent I&D of a right-sided scrotal abscess last month in 2024 now presenting with a left-sided scrotal abscess with ultrasound measuring 5 cm in greatest dimension and with significant tenderness to palpation on exam.    Operative Findings:  Scrotal abscess at the left posterior aspect of the hemiscrotum almost in the perineum with gross pus evacuated upon opening the tissue  Abscess cavity measured approximately 5 cm x 4 cm x 3 cm  Cavity was thoroughly irrigated  Wound edges that appeared poorly viable were excised  Wound packed with 1 inch iodoform gauze      Complications:   None    Procedure and Technique:  The patient was brought to the operating room.  Anesthesia was induced.    There moved to dorsal lithotomy position. They were prepped and draped in standard sterile fashion.  A time-out was performed identifying the correct patient site and procedure.    The abscess was located on the left side of the scrotum just off of midline towards the posterior aspect almost into the perineum.  It did not appear to involve the testicle.   A blade was used to open the skin and soft tissue overlying the  abscess.  Purulent fluid was expressed.  Culture swabs were used to culture the wound for both aerobic and anaerobic samples.    The wound was opened further sharply to create a cruciate incision measuring 4 cm x 2 cm.  It was copiously irrigated with bulb irrigation.    He was then rongeured with a combination of finger and Elizabeth dissection to open up the abscess space.    The abscess cavity was carefully examined visually and by palpation.  There was no concern for adjacent organ involvement.    Hemostasis was obtained with spot electrocautery.  The wound was then copiously irrigated again.  Hemostasis appeared appropriate.  Wound edges along the superior lateral aspect appeared poorly viable and was excised sharply until bleeding tissue was seen.  Hemostasis was again obtained.  The wound edges were infiltrated with 20 cc of 0.25% Marcaine.  The wound was packed with 1-inch iodoform gauze requiring about half the bottle.    This completed the procedure.  The patient was returned to the supine position.  Fluffs and mesh underwear were placed.    Anesthesia was reversed and patient was transported to PACU.  They tolerated the procedure well    A qualified resident was not present to help.    PLAN:  The wound packing will be changed twice a day and patient will continue on antibiotics as directed by culture data.     A qualified resident physician was not available.    Patient Disposition:  PACU         SIGNATURE: Yung Maria MD  DATE: May 27, 2024  TIME: 9:26 AM

## 2024-05-27 NOTE — ANESTHESIA PREPROCEDURE EVALUATION
Procedure:  INCISION AND DRAINAGE (I&D) SCROTUM (Left: Scrotum)    Relevant Problems   No relevant active problems        Physical Exam    Airway    Mallampati score: I  TM Distance: >3 FB  Neck ROM: full     Dental       Cardiovascular  Cardiovascular exam normal    Pulmonary  Pulmonary exam normal     Other Findings        Anesthesia Plan  ASA Score- 1     Anesthesia Type- general with ASA Monitors.         Additional Monitors:     Airway Plan:            Plan Factors-Exercise tolerance (METS): >4 METS.    Chart reviewed. EKG reviewed. Imaging results reviewed. Existing labs reviewed. Patient summary reviewed.                  Induction- intravenous.    Postoperative Plan- Plan for postoperative opioid use. Planned trial extubation    Perioperative Resuscitation Plan - Level 1 - Full Code.       Informed Consent- Anesthetic plan and risks discussed with patient.  I personally reviewed this patient with the CRNA. Discussed and agreed on the Anesthesia Plan with the CRNA..

## 2024-05-27 NOTE — PROGRESS NOTES
"[facility]  Progress Note  Name: Demetri Miller I  MRN: [id number]  Unit/Bed#: -01 I Date of Admission: 5/26/2024   Date of Service: 5/27/2024 I Hospital Day: 1    Assessment & Plan   Scrotal abscess  Assessment & Plan  Patient originally diagnosed with scrotal abscess on 4/26 by Dr. Lira, initially doing well and seen on outpatient for wound check which revealed that it was healing on 5/10.  Patient now presenting due to left-sided scrotal pain which began on Wednesday and progressively worsened.    -Scrotal ultrasound revealing left-sided scrotal wall thickening and edema with 5 cm echogenic collection with peripheral vascularity suspicious for an abscess   -Physical examination revealingEdematous scrotum induration on the right scrotum majority of tenderness in the left scrotum with fluctuance appreciated, exam limited due to significant tenderness  -Discussed with patient moving forward with scrotal incision and drainage in the operating room today.  Discussed risk of procedure including bleeding, damage nearby structures and risk of recurrence.  Surgical consent was obtained and signed.  -Discussed with nursing staff [facility] a.m. Lovenox  -Keep n.p.o.  -Proceed to operating room               Subjective/Objective   Chief Complaint:     Subjective: Patient reporting severely tender left scrotum making it difficult for ambulation and movement.    Objective:     Blood pressure 142/74, pulse 88, temperature 98 °F (36.7 °C), temperature source Oral, resp. rate 18, height 5' 10\" (1.778 m), weight 72.6 kg (160 lb 0.9 oz), SpO2 95%.,Body mass index is 22.97 kg/m².      Intake/Output Summary (Last 24 hours) at 5/27/2024 0710  Last data filed at 5/26/2024 1639  Gross per 24 hour   Intake 1000 ml   Output --   Net 1000 ml       Invasive Devices       Peripheral Intravenous Line  Duration             Peripheral IV 05/26/24 Right Hand <1 day                Physical " Exam  Constitutional:       General: He is not in acute distress.     Appearance: He is normal weight. He is not ill-appearing, toxic-appearing or diaphoretic.   HENT:      Head: Normocephalic and atraumatic.      Right Ear: External ear normal.      Left Ear: External ear normal.      Nose: Nose normal.      Mouth/Throat:      Pharynx: Oropharynx is clear.   Eyes:      Conjunctiva/sclera: Conjunctivae normal.   Cardiovascular:      Rate and Rhythm: Normal rate and regular rhythm.      Pulses: Normal pulses.      Heart sounds: No murmur heard.     No friction rub. No gallop.   Pulmonary:      Effort: Pulmonary effort is normal. No respiratory distress.      Breath sounds: No wheezing, rhonchi or rales.   Abdominal:      General: Bowel sounds are normal. There is no distension.   Genitourinary:     Comments: Edematous scrotum induration on the right scrotum majority of tenderness in the left scrotum with fluctuance appreciated, exam limited due to significant tenderness  Musculoskeletal:      Cervical back: Normal range of motion.   Skin:     General: Skin is warm and dry.   Neurological:      General: No focal deficit present.      Mental Status: He is alert and oriented to person, place, and time.   Psychiatric:         Mood and Affect: Mood normal.         Behavior: Behavior normal.         Thought Content: Thought content normal.         Judgment: Judgment normal.           Lab, Imaging and other studies:  Lab Results   Component Value Date    WBC 11.02 (H) 05/27/2024    HGB 14.5 05/27/2024    HCT 42.6 05/27/2024    MCV 87 05/27/2024     05/27/2024     Lab Results   Component Value Date    SODIUM 139 05/27/2024    K 3.4 (L) 05/27/2024     05/27/2024    CO2 24 05/27/2024    BUN 8 05/27/2024    CREATININE 0.59 (L) 05/27/2024    GLUC 112 05/27/2024    CALCIUM 8.6 05/27/2024       VTE Pharmacologic Prophylaxis: lovenox  VTE Mechanical Prophylaxis: sequential compression device      Diandra Stark,  KATT

## 2024-05-27 NOTE — PROGRESS NOTES
"    INTERNAL MEDICINE RESIDENCY PROGRESS NOTE     Name: Demetri Miller   Age & Sex: 48 y.o. male   MRN: [id number]  Unit/Bed#: [facility]   Encounter: [id number]  Team: SOD Team A    PATIENT INFORMATION     Name: Demetri Miller   Age & Sex: 48 y.o. male   MRN: [id number]  Hospital Stay Days: 1    ASSESSMENT/PLAN     Active Problems:    Scrotal abscess    DM (diabetes mellitus) (HCC)    Elevated BP without diagnosis of hypertension    Tobacco use      Tobacco use  Assessment & Plan  1/2 per day past 20 years (10pack year hx). Does not want nicotine patches today. Interested in quitting.    Provide smoking cessation materials on dc  Discuss with PCP as outpt and est care    Elevated BP without diagnosis of hypertension  Assessment & Plan  BP on admission 203/87 improved to 153/75   2 out of 2 setting of pain.    BP less than 160 while inpatient  Continue to trend BP if antihypertensive is needed while inpatient    DM (diabetes mellitus) (HCC)  Assessment & Plan  Lab Results   Component Value Date    HGBA1C 9.6 (H) 04/25/2024       No results for input(s): \"POCGLU\" in the last 72 hours.    History of diabetes per chart review however previous A1c is unknown prior to April/does not follow-up with physicians/PCP.    Blood Sugar Average: Last 72 hrs:    Initiated on Lantus 6 units with sliding scale insulin algorithm to; can consider mealtime insulin after 24 hours.  Will need diabetic education  Goal glucose 140-160    Scrotal abscess  Assessment & Plan  Recent hospital admission 4/25 for right-sided scrotal abscess status post I&D by urology at that time.  Now with recurrence and worsening signs and symptoms over the past week with intermittent fevers.  Not meeting sepsis criteria on admission.  Status post Bactrim x 1 in ED.    Previous cultures on last admission positive for beta-hemolytic strep.  A1c that resulted after admission of 9.6 with no follow-up with PCP/establishment of PCP.    Urology on board, OR today " "for I&D  Bactrim for now, would be preferable to add amoxicillin/cephalosporin however patient does confirm anaphylaxis to penicillin, follow-up cultures  Pain regimen: Tylenol 975 every 8 scheduled; oxycodone 2.5/5mg prn mod/severe; dilaudid 0.2 breakthrough  Controlled DM as below  F/U UA        Disposition: pending I&D / urology recs    SUBJECTIVE     Patient seen and examined. No acute events overnight.  States no nausea vomiting chest pain shortness of breath.  States that the pain in his groin is still present but tolerable when he is relaxed.  Does not wish to pursue oxycodone if able while inpatient.  States he is 1/2 pack/day smoker for past 20 years but does not feel that he needs any nicotine patches as of this morning.  States he may quit after this recent hospitalization.    OBJECTIVE     Vitals:    24 0722   BP: 146/73 142/74  142/74   BP Location: Left arm Left arm  Left arm   Pulse: 94 88  89   Resp: 16 18  18   Temp:  98 °F (36.7 °C)  98.4 °F (36.9 °C)   TempSrc:  Oral  Oral   SpO2: 96% 96% 95% 93%   Weight:  72.6 kg (160 lb 0.9 oz)     Height:  5' 10\" (1.778 m)        Temperature:   Temp (24hrs), Av.3 °F (36.8 °C), Min:97.8 °F (36.6 °C), Max:99 °F (37.2 °C)    Temperature: 98.4 °F (36.9 °C)  Intake & Output:  I/O          07 07 0701   07 07 0700    P.O.   0    IV Piggyback  1000     Total Intake(mL/kg)  1000 (13.8) 0 (0)    Urine (mL/kg/hr)   0 (0)    Total Output   0    Net  +1000 0                 Weights:   IBW (Ideal Body Weight): 73 kg    Body mass index is 22.97 kg/m².  Weight (last 2 days)       Date/Time Weight    24 21:32:19 72.6 (160.05)          Physical Exam  Constitutional:       General: He is not in acute distress.     Appearance: He is not ill-appearing or diaphoretic.   HENT:      Mouth/Throat:      Mouth: Mucous membranes are moist.      Pharynx: Oropharynx is clear.   Eyes:      " General: No scleral icterus.  Cardiovascular:      Rate and Rhythm: Normal rate and regular rhythm.      Heart sounds: No murmur heard.  Pulmonary:      Effort: Pulmonary effort is normal.      Breath sounds: Normal breath sounds. No wheezing, rhonchi or rales.   Abdominal:      General: Abdomen is flat. Bowel sounds are normal.   Musculoskeletal:      Right lower leg: No edema.      Left lower leg: No edema.   Neurological:      Mental Status: He is alert and oriented to person, place, and time. Mental status is at baseline.   Psychiatric:         Mood and Affect: Mood normal.         Behavior: Behavior normal.         Thought Content: Thought content normal.         Judgment: Judgment normal.       LABORATORY DATA     Labs: I have personally reviewed pertinent reports.  Results from last 7 days   Lab Units 05/27/24  0515 05/26/24  1540   WBC Thousand/uL 11.02* 10.10   HEMOGLOBIN g/dL 14.5 15.0   HEMATOCRIT % 42.6 43.7   PLATELETS Thousands/uL 210 207   SEGS PCT % 75 76*   MONO PCT % 9 9   EOS PCT % 1 0      Results from last 7 days   Lab Units 05/27/24  0515 05/26/24  1540   POTASSIUM mmol/L 3.4* 3.5   CHLORIDE mmol/L 103 101   CO2 mmol/L 24 25   BUN mg/dL 8 8   CREATININE mg/dL 0.59* 0.55*   CALCIUM mg/dL 8.6 8.8   ALK PHOS U/L  --  92   ALT U/L  --  20   AST U/L  --  15     Results from last 7 days   Lab Units 05/27/24  0515   MAGNESIUM mg/dL 1.9          Results from last 7 days   Lab Units 05/26/24  1540   INR  1.03   PTT seconds 25     Results from last 7 days   Lab Units 05/26/24  1540   LACTIC ACID mmol/L 1.0           IMAGING & DIAGNOSTIC TESTING     Radiology Results: I have personally reviewed pertinent reports.  US scrotum and testicles    Result Date: 5/26/2024  Impression: Left-sided scrotal wall thickening and edema with 5 cm echogenic collection with peripheral vascularity suspicious for an abscess. The study was marked in EPIC for immediate notification. Workstation performed: [id number]     Other  "Diagnostic Testing: I have personally reviewed pertinent reports.    ACTIVE MEDICATIONS     Current Facility-Administered Medications   Medication Dose Route Frequency    [Transfer Hold] acetaminophen (TYLENOL) tablet 975 mg  975 mg Oral Q8H AUSTYN    enoxaparin (LOVENOX) subcutaneous injection 40 mg  40 mg Subcutaneous Daily    [Transfer Hold] HYDROmorphone HCl (DILAUDID) injection 0.2 mg  0.2 mg Intravenous Q2H PRN    [Transfer Hold] insulin glargine (LANTUS) subcutaneous injection 6 Units 0.06 mL  6 Units Subcutaneous HS    [Transfer Hold] insulin lispro (HumALOG/ADMELOG) 100 units/mL subcutaneous injection 1-5 Units  1-5 Units Subcutaneous 4x Daily (AC & HS)    [Transfer Hold] oxyCODONE (ROXICODONE) split tablet 2.5 mg  2.5 mg Oral Q4H PRN    Or    [Transfer Hold] oxyCODONE (ROXICODONE) IR tablet 5 mg  5 mg Oral Q4H PRN    [Transfer Hold] sulfamethoxazole-trimethoprim (BACTRIM) 400-80 mg per tablet 2 tablet  2 tablet Oral Q12H AUSTYN       VTE Pharmacologic Prophylaxis: Enoxaparin (Lovenox)  VTE Mechanical Prophylaxis: sequential compression device    Portions of the record may have been created with voice recognition software.  Occasional wrong word or \"sound a like\" substitutions may have occurred due to the inherent limitations of voice recognition software.  Read the chart carefully and recognize, using context, where substitutions have occurred.  ==  Maria Victoria Ashley DO  [facility]  Internal Medicine, PGY 1  05/27/24 8:48 AM     "

## 2024-05-27 NOTE — ASSESSMENT & PLAN NOTE
Lab Results   Component Value Date    HGBA1C 9.6 (H) 04/25/2024       Recent Labs     05/26/24  1829 05/26/24  2227   POCGLU 137 210*   Glucose control essential for prevention of recurrence of scrotal abscesses  Per primary team  Blood Sugar Average: Last 72 hrs:  (P) 173.5

## 2024-05-27 NOTE — ASSESSMENT & PLAN NOTE
S/p I&D of left scrotal abscess measuring 5 cm x 4 cm x 3 cm on 5/27  Continue twice daily scrotal packing changes with 1 inch iodoform  Empiric broad-spectrum antibiotics tailored to wound culture  Preliminary wound culture: 4+ disintegrating polys, 4+ gram-positive cocci in pairs, chains and clusters, 2+ gram variable rods  WBC 7.73  Follow-up with urology outpatient  Discharge home per primary team when medically stable and on appropriate antibiotics

## 2024-05-27 NOTE — QUICK NOTE
Called patients father, updating them about ongoing treatment plan and any patient updates.      Maria Victoria Ashley,   Internal Medicine Residency PGY-1  [facility], [place]  Available on [employer]

## 2024-05-28 VITALS
HEART RATE: 76 BPM | SYSTOLIC BLOOD PRESSURE: 131 MMHG | BODY MASS INDEX: 22.91 KG/M2 | HEIGHT: 70 IN | TEMPERATURE: 97.2 F | WEIGHT: 160.05 LBS | DIASTOLIC BLOOD PRESSURE: 74 MMHG | OXYGEN SATURATION: 97 % | RESPIRATION RATE: 16 BRPM

## 2024-05-28 PROBLEM — E11.9 TYPE 2 DIABETES MELLITUS WITHOUT COMPLICATION, WITHOUT LONG-TERM CURRENT USE OF INSULIN (HCC): Status: ACTIVE | Noted: 2024-05-28

## 2024-05-28 PROBLEM — N49.2 SCROTAL ABSCESS: Status: RESOLVED | Noted: 2024-04-26 | Resolved: 2024-05-28

## 2024-05-28 LAB
ANION GAP SERPL CALCULATED.3IONS-SCNC: 9 MMOL/L (ref 4–13)
BASOPHILS # BLD AUTO: 0.02 THOUSANDS/ÂΜL (ref 0–0.1)
BASOPHILS NFR BLD AUTO: 0 % (ref 0–1)
BUN SERPL-MCNC: 9 MG/DL (ref 5–25)
CALCIUM SERPL-MCNC: 8.7 MG/DL (ref 8.4–10.2)
CHLORIDE SERPL-SCNC: 102 MMOL/L (ref 96–108)
CO2 SERPL-SCNC: 29 MMOL/L (ref 21–32)
CREAT SERPL-MCNC: 0.62 MG/DL (ref 0.6–1.3)
EOSINOPHIL # BLD AUTO: 0.16 THOUSAND/ÂΜL (ref 0–0.61)
EOSINOPHIL NFR BLD AUTO: 2 % (ref 0–6)
ERYTHROCYTE [DISTWIDTH] IN BLOOD BY AUTOMATED COUNT: 12 % (ref 11.6–15.1)
GFR SERPL CREATININE-BSD FRML MDRD: 116 ML/MIN/1.73SQ M
GLUCOSE SERPL-MCNC: 112 MG/DL (ref 65–140)
GLUCOSE SERPL-MCNC: 127 MG/DL (ref 65–140)
GLUCOSE SERPL-MCNC: 144 MG/DL (ref 65–140)
HCT VFR BLD AUTO: 41.1 % (ref 36.5–49.3)
HGB BLD-MCNC: 13.4 G/DL (ref 12–17)
IMM GRANULOCYTES # BLD AUTO: 0.03 THOUSAND/UL (ref 0–0.2)
IMM GRANULOCYTES NFR BLD AUTO: 0 % (ref 0–2)
LYMPHOCYTES # BLD AUTO: 2.06 THOUSANDS/ÂΜL (ref 0.6–4.47)
LYMPHOCYTES NFR BLD AUTO: 27 % (ref 14–44)
MCH RBC QN AUTO: 29.3 PG (ref 26.8–34.3)
MCHC RBC AUTO-ENTMCNC: 32.6 G/DL (ref 31.4–37.4)
MCV RBC AUTO: 90 FL (ref 82–98)
MONOCYTES # BLD AUTO: 0.8 THOUSAND/ÂΜL (ref 0.17–1.22)
MONOCYTES NFR BLD AUTO: 10 % (ref 4–12)
NEUTROPHILS # BLD AUTO: 4.66 THOUSANDS/ÂΜL (ref 1.85–7.62)
NEUTS SEG NFR BLD AUTO: 61 % (ref 43–75)
NRBC BLD AUTO-RTO: 0 /100 WBCS
PLATELET # BLD AUTO: 193 THOUSANDS/UL (ref 149–390)
PMV BLD AUTO: 10.3 FL (ref 8.9–12.7)
POTASSIUM SERPL-SCNC: 4 MMOL/L (ref 3.5–5.3)
RBC # BLD AUTO: 4.58 MILLION/UL (ref 3.88–5.62)
SODIUM SERPL-SCNC: 140 MMOL/L (ref 135–147)
WBC # BLD AUTO: 7.73 THOUSAND/UL (ref 4.31–10.16)

## 2024-05-28 PROCEDURE — 99024 POSTOP FOLLOW-UP VISIT: CPT | Performed by: UROLOGY

## 2024-05-28 PROCEDURE — 97166 OT EVAL MOD COMPLEX 45 MIN: CPT

## 2024-05-28 PROCEDURE — 82948 REAGENT STRIP/BLOOD GLUCOSE: CPT

## 2024-05-28 PROCEDURE — 85025 COMPLETE CBC W/AUTO DIFF WBC: CPT

## 2024-05-28 PROCEDURE — 80048 BASIC METABOLIC PNL TOTAL CA: CPT

## 2024-05-28 PROCEDURE — 97162 PT EVAL MOD COMPLEX 30 MIN: CPT

## 2024-05-28 RX ORDER — INSULIN GLARGINE 100 [IU]/ML
6 INJECTION, SOLUTION SUBCUTANEOUS
Qty: 1.8 ML | Refills: 0 | Status: SHIPPED | OUTPATIENT
Start: 2024-05-28 | End: 2024-06-07

## 2024-05-28 RX ORDER — METFORMIN HYDROCHLORIDE 500 MG/1
TABLET, EXTENDED RELEASE ORAL
Qty: 42 TABLET | Refills: 0 | Status: SHIPPED | OUTPATIENT
Start: 2024-05-28 | End: 2024-06-07 | Stop reason: SDUPTHER

## 2024-05-28 RX ORDER — CEFUROXIME AXETIL 500 MG/1
500 TABLET ORAL EVERY 12 HOURS SCHEDULED
Qty: 14 TABLET | Refills: 0 | Status: SHIPPED | OUTPATIENT
Start: 2024-05-28 | End: 2024-06-04

## 2024-05-28 RX ORDER — INSULIN LISPRO 100 [IU]/ML
2 INJECTION, SOLUTION INTRAVENOUS; SUBCUTANEOUS
Status: DISCONTINUED | OUTPATIENT
Start: 2024-05-28 | End: 2024-05-28

## 2024-05-28 RX ORDER — SULFAMETHOXAZOLE AND TRIMETHOPRIM 400; 80 MG/1; MG/1
2 TABLET ORAL EVERY 12 HOURS SCHEDULED
Qty: 28 TABLET | Refills: 0 | Status: SHIPPED | OUTPATIENT
Start: 2024-05-28 | End: 2024-06-04

## 2024-05-28 RX ORDER — INSULIN GLARGINE 100 [IU]/ML
6 INJECTION, SOLUTION SUBCUTANEOUS DAILY
Qty: 1.8 ML | Refills: 0 | Status: SHIPPED | OUTPATIENT
Start: 2024-05-28 | End: 2024-05-28

## 2024-05-28 RX ADMIN — ACETAMINOPHEN 975 MG: 325 TABLET, FILM COATED ORAL at 06:13

## 2024-05-28 RX ADMIN — ACETAMINOPHEN 975 MG: 325 TABLET, FILM COATED ORAL at 13:25

## 2024-05-28 RX ADMIN — ENOXAPARIN SODIUM 40 MG: 40 INJECTION SUBCUTANEOUS at 08:13

## 2024-05-28 RX ADMIN — SULFAMETHOXAZOLE AND TRIMETHOPRIM 2 TABLET: 400; 80 TABLET ORAL at 08:13

## 2024-05-28 NOTE — PHYSICAL THERAPY NOTE
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]    Problem List  Patient Active Problem List   Diagnosis    DM (diabetes mellitus) (HCC)    Psoriasis    Elevated BP without diagnosis of hypertension    Tobacco use    Type 2 diabetes mellitus without complication, without long-term current use of insulin (HCC)       Past Medical History  Past Medical History:   Diagnosis Date    Diabetes mellitus (HCC)     prediabetic     Psoriasis        Past Surgical History  Past Surgical History:   Procedure Laterality Date    INCISION AND DRAINAGE  4/26/2024    INCISION AND DRAINAGE OF WOUND Left 5/27/2024    Procedure: INCISION AND DRAINAGE LEFT SCROTUM;  Surgeon: Yung Maria MD;  Location: [facility];  Service: Urology          05/28/24 0858   PT Last Visit   PT Visit Date 05/28/24   Note Type   Note type Evaluation   Pain Assessment   Pain Assessment Tool 0-10   Pain Score 5   Pain Location/Orientation Other (Comment)  (scrotum)   Patient's Stated Pain Goal No pain   Hospital Pain Intervention(s) Repositioned;Ambulation/increased activity   Restrictions/Precautions   Weight Bearing Precautions Per Order No   Other Precautions Pain   Home Living   Type of Home House   Home Layout Two level;Stairs to enter with rails  (4STE)   Bathroom Shower/Tub Tub/shower unit   Bathroom Toilet Raised   Bathroom Equipment   (denies)   Bathroom Accessibility Accessible   Home Equipment Cane  (does not use PTA)   Prior Function   Level of [place] Independent with ADLs;Independent with functional mobility;Independent with IADLS   Lives With Alone   Receives Help From Family   IADLs Independent with driving;Independent with meal prep;Independent with medication management   Falls in the last 6 months 0   Vocational Full time employment   General   Family/Caregiver Present No   Cognition   Overall Cognitive Status WFL    Arousal/Participation Alert   Attention Within functional limits   Orientation Level Oriented X4   Memory Within functional limits   Following Commands Follows all commands and directions without difficulty   Subjective   Subjective pt pleasant and cooperative throughout therapy session. pt received supine in bed   RLE Assessment   RLE Assessment WFL   LLE Assessment   LLE Assessment WFL   Bed Mobility   Supine to Sit 6  Modified independent   Additional items Increased time required   Sit to Supine Unable to assess   Transfers   Sit to Stand 6  Modified independent   Additional items Increased time required   Stand to Sit 6  Modified independent   Additional items Increased time required   Additional Comments transfers w/o AD   Ambulation/Elevation   Gait pattern Improper Weight shift;Antalgic;Wide NADINE   Gait Assistance 5  Supervision   Additional items Verbal cues   Assistive Device None   Distance 300'   Stair Management Assistance 5  Supervision   Additional items Verbal cues   Stair Management Technique One rail R;Alternating pattern;Foreward   Number of Stairs 8   Balance   Static Sitting Good   Dynamic Sitting Good   Static Standing Fair +   Dynamic Standing Fair +   Ambulatory Fair +   Endurance Deficit   Endurance Deficit No   Activity Tolerance   Activity Tolerance Patient tolerated treatment well   Medical Staff Made Aware OT Kendra, co-eval due to medical complexity and multiple comorbidities   Nurse Made Aware RN cleared and updated   Assessment   Prognosis Good   Problem List Pain;Decreased mobility   Assessment Pt seen for moderate (evolving) complexity PT evaluation. Moderate eval due to Ongoing medical management for primary dx, Continuous pulse oximetry monitoring , s/p surgical intervention Patient is a 48 y.o. male  who was admitted to [facility] on 5/26/2024  with Scrotal abscess . Pt presents to [facility] [place] with increased pain in groin .  At baseline, pt resides alone in house and was  independent prior to hospital admission. Currently, upon initial examination, pt  is requiring  modified independent   for bed mobility skills;  modified independent   for functional transfers and  supervision   for ambulation with no AD. Pt was left seated in bedside recliner at the end of PT session with all needs in reach. Pt with no questions or concerns regarding d/c home; appears to be functioning at/ near baseline mobility levels. Pt with no further acute inpatient PT needs at this time- please re-consult if needed. PT to discharge pt at this time. Encourage pt to ambulate at least 3-4x/day with restorative/ nursing staff while remaining in hospital. The patient's AM-PAC Basic Mobility Inpatient Short Form Raw Score is 24, Standardized Score is 57.68. Based on AM-PAC scoring and patient presentation, PT currently recommending No Post Acute Rehab Needs. Please also refer to the recommendation of the Physical Therapist for safe discharge planning.   Barriers to Discharge None   Goals   Patient Goals to go home   Plan   Treatment/Interventions   (eval only, dc IPPT)   PT Frequency   (eval only, dc IPPT)   Discharge Recommendation   Rehab Resource Intensity Level, PT No post-acute rehabilitation needs   AM-PAC Basic Mobility Inpatient   Turning in Flat Bed Without Bedrails 4   Lying on Back to Sitting on Edge of Flat Bed Without Bedrails 4   Moving Bed to Chair 4   Standing Up From Chair Using Arms 4   Walk in Room 4   Climb 3-5 Stairs With Railing 4   Basic Mobility Inpatient Raw Score 24   Basic Mobility Standardized Score 57.68   [place] Highest Level Of Mobility   JH-HLM Goal 8: Walk 250 feet or more   JH-HLM Achieved 8: Walk 250 feet ot more   End of Consult   Patient Position at End of Consult Bedside chair;All needs within reach  (RN aware of no chair alarm)         Gurinder Molina, PT

## 2024-05-28 NOTE — OCCUPATIONAL THERAPY NOTE
Occupational Therapy Evaluation     Patient Name: Demetri Miller  Today's Date: 5/28/2024  Problem List  Active Problems:    Scrotal abscess    DM (diabetes mellitus) (HCC)    Elevated BP without diagnosis of hypertension    Tobacco use    Past Medical History  Past Medical History:   Diagnosis Date    Diabetes mellitus (HCC)     prediabetic     Psoriasis      Past Surgical History  Past Surgical History:   Procedure Laterality Date    INCISION AND DRAINAGE  4/26/2024 05/28/24 0857   OT Last Visit   OT Visit Date 05/28/24   Note Type   Note type Evaluation   Pain Assessment   Pain Assessment Tool 0-10   Pain Score 5   Pain Location/Orientation   (scrotum)   Restrictions/Precautions   Weight Bearing Precautions Per Order No   Other Precautions Pain   Home Living   Type of Home House   Home Layout Two level;Performs ADLs on one level;Able to live on main level with bedroom/bathroom;Stairs to enter with rails  (4 QUETA)   Bathroom Shower/Tub Tub/shower unit   Bathroom Toilet Raised   Bathroom Equipment   (denies)   Home Equipment Cane  (does not use at baseline)   Prior Function   Level of [place] Independent with ADLs;Independent with functional mobility;Independent with IADLS   Lives With Alone   Receives Help From Family  (parents live close by)   IADLs Independent with driving;Independent with meal prep;Independent with medication management   Falls in the last 6 months 0   Vocational Full time employment  ([employer])   Lifestyle   Autonomy Pt reports (I) with ADLs, IADLs, and functional mobiltiy without AD at baseline. Pt +  and employed full time at [employer]   Reciprocal Relationships family   Service to Others working full time   ADL   Where Assessed Edge of bed   Eating Assistance 6  Modified independent   Grooming Assistance 6  Modified Independent   UB Bathing Assistance 6  Modified Independent   LB Bathing Assistance 6  Modified Independent   UB Dressing Assistance 6   Modified independent   LB Dressing Assistance 6  Modified independent   Toileting Assistance  6  Modified independent   Functional Assistance 6  Modified independent   Bed Mobility   Supine to Sit 6  Modified independent   Additional items Increased time required;HOB elevated   Transfers   Sit to Stand 6  Modified independent   Additional items Increased time required;Verbal cues   Stand to Sit 6  Modified independent   Additional items Increased time required;Verbal cues   Additional Comments no AD   Functional Mobility   Functional Mobility 5  Supervision   Additional Comments Pt requires supervision to ambulate household distance functional mobility without AD   Balance   Static Sitting Good   Dynamic Sitting Fair +   Static Standing Fair   Dynamic Standing Fair   Ambulatory Fair -   Activity Tolerance   Activity Tolerance Patient tolerated treatment well   Medical Staff Made Aware PT   Nurse Made Aware RN Cleared   RUE Assessment   RUE Assessment WFL   LUE Assessment   LUE Assessment WFL   Hand Function   Gross Motor Coordination Functional   Fine Motor Coordination Functional   Cognition   Overall Cognitive Status WFL   Arousal/Participation Alert;Responsive;Cooperative   Attention Within functional limits   Orientation Level Oriented X4   Memory Within functional limits   Following Commands Follows all commands and directions without difficulty   Comments Pt agreeable to therapy   Assessment   Prognosis Good   Assessment Pt is a 49 y/o male that was admitted to [facility] 5/26/2024 s/p incision and drainage of L scrotum. Pt  has a past medical history of Diabetes mellitus (HCC) and Psoriasis. Pt lives alone in a two level house with 4 QUETA, pt reports he is able to stay on the main level of the home. Pt reports using no AD at baseline. Prior to admission pt (I) ADLs, IADLs, and functional mobility. Pt currently MOD IND to supervision for all ADLs, functional transfers, and household distance functional  mobility without AD. Pt supine in bed at begning of session, pt seated in bedside chair at end of session with  items within reach. The patient's raw score on the AM-PAC Daily Activity Inpatient Short Form is 24. A raw score of greater than or equal to 19 suggests the patient may benefit from discharge to home. Please refer to the recommendation of the Occupational Therapist for safe discharge planning. Pt appears to be functioning at/close to baseline, further OT services not indicated at this time. Will d/c OT, please re-consult if needs arise.   Goals   Patient Goals to go home   Discharge Recommendation   Rehab Resource Intensity Level, OT No post-acute rehabilitation needs   AM-PAC Daily Activity Inpatient   Lower Body Dressing 4   Bathing 4   Toileting 4   Upper Body Dressing 4   Grooming 4   Eating 4   Daily Activity Raw Score 24   Daily Activity Standardized Score (Calc for Raw Score >=11) 57.54   AM-PAC Applied Cognition Inpatient   Following a Speech/Presentation 4   Understanding Ordinary Conversation 4   Taking Medications 4   Remembering Where Things Are Placed or Put Away 4   Remembering List of 4-5 Errands 4   Taking Care of Complicated Tasks 4   Applied Cognition Raw Score 24   Applied Cognition Standardized Score 62.21   End of Consult   Education Provided Yes   Patient Position at End of Consult Bedside chair;All needs within reach   Nurse Communication Nurse aware of consult     CHICO Bragg, OTR/L

## 2024-05-28 NOTE — DISCHARGE INSTR - OTHER ORDERS
Remove packing twice a day and as needed if it becomes soiled  Irrigate wound with saline  Okay to shower when packing is out and replace after shower  Replace packing twice a day and as needed  Cover with gauze  Scrotal support  Follow-up with urology for wound check; notify urology for any problems with wound in the meantime

## 2024-05-28 NOTE — DISCHARGE SUMMARY
"      INTERNAL MEDICINE RESIDENCY DISCHARGE SUMMARY     Demetri Miller   48 y.o. male  MRN: [id number]  Room/Bed: /-01     [facility] BE MED SURG 5   Encounter: [id number]    Active Problems:    Scrotal abscess    DM (diabetes mellitus) ([facility])    Elevated BP without diagnosis of hypertension    Tobacco use    Type 2 diabetes mellitus without complication, without long-term current use of insulin ([facility])      Scrotal abscess  Assessment & Plan  Recent hospital admission 4/25 for right-sided scrotal abscess status post I&D by urology at that time.  Now with recurrence and worsening signs and symptoms over the past week with intermittent fevers.  Not meeting sepsis criteria on admission.  Status post Bactrim x 1 in ED.    Previous cultures on last admission positive for beta-hemolytic strep.  A1c that resulted after admission of 9.6 with no follow-up with PCP/establishment of PCP.    Urology was consulted and patient underwent I&D of scrotal abscess at the left posterior aspect of the hemiscrotum almost in the perineum with gross pus.     Patient will be discharged on Bactrim to cover for MRSA and Cefuroxime to cover for Strep. Patient will be notified when cultures return and if any changed in abx course need to be made. Patient will take Tylenol for pain management as needed and follow up with PCP and Urology.     DM (diabetes mellitus) ([facility])  Assessment & Plan  Lab Results   Component Value Date    HGBA1C 9.6 (H) 04/25/2024       No results for input(s): \"POCGLU\" in the last 72 hours.    History of diabetes per chart review however previous A1c is unknown prior to April/does not follow-up with physicians/PCP.    Blood Sugar Average: Last 72 hrs:    Initiated on Lantus 6 units with sliding scale insulin algorithm. Patient will also be taking Metformin 500mg once a day for 14 days then 2x500 mg for 14 days. Patient received insulin and diabetic education upon " discharge.     Tobacco use  Assessment & Plan  1/2 per day past 20 years (10pack year hx). Does not want nicotine patches today. Interested in quitting.    Provide smoking cessation materials on dc  Discuss with PCP as outpt and est care    Elevated BP without diagnosis of hypertension  Assessment & Plan  BP on admission 203/87 improved to 153/75   2 out of 2 setting of pain.    BP less than 160 while inpatient  F/u with PCP        DETAILS OF HOSPITAL COURSE     Demetri Miller is a 48 y.o. M with a hx of uncontrolled D2M, tobacco use and psoriasis who presented to the ED with complaints of painful left scrotum that lasted for several days with intermittent fevers. Patient stated that he has experienced this pain with prior abscesses. Patient recently had right scrotal abscess that was treated with admission and bedside I&D in late April. He also completed a course of TMP/SMX and followed up with urology. Patient also reported a remote history of left scrotal abscess approximately 6 years ago. He denies any recent injury or skin tear in the scrotum. He has a family history of DM and last Hemoglobin A1c 9.6 and he did not follow up with PCP.     Patient's BP on admission was 203/87 most likely secondary to pain. Patient did not meet sepsis criteria on admission and received 1 dose of Bactrim in the ED. UA revealed leukocytes, glucose, ketones. Gram stain grew disintegrating polys, gram positive cocci in pairs, chains and clusters and gram variable rods. Urology was consulted and patient underwent I&D of scrotal abscess at the left posterior aspect of the hemiscrotum almost in the perineum with gross pus. Cavity was properly irrigated and packed with iodoform gauze.     Patient endorses burning sensation, but says pain is down to 6/10 s/p I&D  and feels relief with Tylenol. Patient will complete Bactrim and cefuroxime course for 7 days. Patient will start Lantus 6U once a day and Metformin 500mg once a day at  breakfast. Patient is encouraged to establish care with PCP for diabetes management and to prevent possible recurrence of abscess.    Physical Exam  Constitutional:       General: He is not in acute distress.     Appearance: Normal appearance. He is not ill-appearing.   HENT:      Head: Normocephalic and atraumatic.      Right Ear: External ear normal.      Left Ear: External ear normal.      Nose: Nose normal.      Mouth/Throat:      Mouth: Mucous membranes are moist.   Eyes:      Extraocular Movements: Extraocular movements intact.      Conjunctiva/sclera: Conjunctivae normal.      Pupils: Pupils are equal, round, and reactive to light.   Cardiovascular:      Rate and Rhythm: Normal rate and regular rhythm.      Pulses: Normal pulses.      Heart sounds: Normal heart sounds.   Pulmonary:      Effort: Pulmonary effort is normal.      Breath sounds: Normal breath sounds.   Abdominal:      General: Abdomen is flat. Bowel sounds are normal.      Palpations: Abdomen is soft.   Genitourinary:     Comments: Scrotal packing in place with small amount of bloody drainage   Musculoskeletal:         General: Normal range of motion.      Cervical back: Normal range of motion and neck supple.   Skin:     General: Skin is warm and dry.   Neurological:      General: No focal deficit present.      Mental Status: He is alert and oriented to person, place, and time.   Psychiatric:         Mood and Affect: Mood normal.         Behavior: Behavior normal.           DISCHARGE INFORMATION     PCP at Discharge: No primary care provider on file. Patient will establish care with mother's PCP.       Admitting Provider: Razia Terry MD  Admission Date: 5/26/2024    Discharge Provider: Tolu Hampton MD  Discharge Date:  5/28/2024    Discharge Disposition: Home/Self Care  Discharge Condition: stable  Discharge with Lines: no    Discharge Diet: regular diet  Activity Restrictions: none  Test Results Pending at Discharge: None    Discharge  Diagnoses:  Active Problems:    Scrotal abscess    DM (diabetes mellitus) ([facility])    Elevated BP without diagnosis of hypertension    Tobacco use    Type 2 diabetes mellitus without complication, without long-term current use of insulin ([facility])  Resolved Problems:    * No resolved hospital problems. *      Consulting Providers:      Diagnostic & Therapeutic Procedures Performed:  US scrotum and testicles    Result Date: 5/26/2024  Impression: Left-sided scrotal wall thickening and edema with 5 cm echogenic collection with peripheral vascularity suspicious for an abscess. The study was marked in EPIC for immediate notification. Workstation performed: [id number]       Code Status: Level 1 - Full Code  Advance Directive & Living Will: <no information>  Power of :    POLST:      Medications:  Current Discharge Medication List        STOP taking these medications       acetaminophen (Tylenol) 325 mg tablet Comments:   Reason for Stopping:             Current Discharge Medication List        START taking these medications    Details   cefuroxime (CEFTIN) 500 mg tablet Take 1 tablet (500 mg total) by mouth every 12 (twelve) hours for 7 days  Qty: 14 tablet, Refills: 0    Associated Diagnoses: Scrotal abscess      insulin glargine (LANTUS) 100 units/mL subcutaneous injection Inject 6 Units under the skin daily at bedtime  Qty: 1.8 mL, Refills: 0    Associated Diagnoses: Type 2 diabetes mellitus without complication, without long-term current use of insulin ([facility])      metFORMIN (GLUCOPHAGE-XR) 500 mg 24 hr tablet Take 1 tablet (500 mg total) by mouth daily with breakfast for 14 days, THEN 2 tablets (1,000 mg total) daily with breakfast for 14 days.  Qty: 42 tablet, Refills: 0    Associated Diagnoses: Scrotal abscess      sulfamethoxazole-trimethoprim (BACTRIM) 400-80 mg per tablet Take 2 tablets by mouth every 12 (twelve) hours for 7 days  Qty: 28 tablet, Refills: 0    Associated Diagnoses: Scrotal abscess        "    Current Discharge Medication List          Allergies:  Allergies   Allergen Reactions   • Aspirin Anaphylaxis   • Penicillins Anaphylaxis       FOLLOW-UP     PCP Outpatient Follow-up:  yes       Consulting Providers Follow-up:  yes  Urology      Active Issues Requiring Follow-up:   none    Discharge Statement:   I spent 45 minutes minutes discharging the patient. This time was spent on the day of discharge. I had direct contact with the patient on the day of discharge. Additional documentation is required if more than 30 minutes were spent on discharge.    Portions of the record may have been created with voice recognition software.  Occasional wrong word or \"sound a like\" substitutions may have occurred due to the inherent limitations of voice recognition software.  Read the chart carefully and recognize, using context, where substitutions have occurred.    ==  Vicky Sánchez, MS4  [facility]      "

## 2024-05-28 NOTE — UTILIZATION REVIEW
"NOTIFICATION OF INPATIENT ADMISSION   AUTHORIZATION REQUEST   SERVICING FACILITY:   Formerly [facility]  Address: [address] [place] [address]  Tax ID: [id number]  NPI: [id number] ATTENDING PROVIDER:  Attending Name and NPI#: Razia Terry Md [[id number]]  Address: [address] [place] [address]  Phone: [phone]   ADMISSION INFORMATION:  Place of Service: Inpatient [facility]  Place of Service Code: 21  Inpatient Admission Date/Time: 5/26/24  5:15 PM  Discharge Date/Time: No discharge date for patient encounter.  Admitting Diagnosis Code/Description:  Scrotal abscess [N49.2]  Benign cyst of both testicles [N44.2]     UTILIZATION REVIEW CONTACT:  Bhumika \"Gwen\"Eren Utilization   Network Utilization Review Department  Phone: [phone]  Fax: [phone]  Email: [email or web address]  Contact for approvals/pending authorizations, clinical reviews, and discharge.     PHYSICIAN ADVISORY SERVICES:  Medical Necessity Denial & Sdty-hv-Ozvd Review  Phone: [phone]  Fax: [phone]  Email: [email or web address]     DISCHARGE SUPPORT TEAM:  For Patients Discharge Needs & Updates  Phone: [phone] opt. 2 Fax: [phone]  Email: [email or web address]     "

## 2024-05-28 NOTE — DISCHARGE INSTR - AVS FIRST PAGE
- Patient is to take insulin 6U once a day   - Patient should take metformin once a day in the morning with breakfast  - F/u with PCP - establish care with mother's PCP or with [facility]  - Antibiotic course will be for 7 days - take both Bactrim and Cefuroxime.  If there will be any changes to antibiotics once cultures return, patient will be notified   - F/u with Urology in 2 weeks   - Refer to wound care instructions

## 2024-05-28 NOTE — UTILIZATION REVIEW
Initial Clinical Review    Admission: Date/Time/Statement:   Admission Orders (From admission, onward)       Ordered        05/26/24 1715  INPATIENT ADMISSION  Once                          Orders Placed This Encounter   Procedures    INPATIENT ADMISSION     Standing Status:   Standing     Number of Occurrences:   1     Order Specific Question:   Level of Care     Answer:   Med Surg [16]     Order Specific Question:   Estimated length of stay     Answer:   More than 2 Midnights     Order Specific Question:   Certification     Answer:   I certify that inpatient services are medically necessary for this patient for a duration of greater than two midnights. See H&P and MD Progress Notes for additional information about the patient's course of treatment.     ED Arrival Information       Expected   -    Arrival   5/26/2024 14:02    Acuity   Urgent              Means of arrival   Walk-In    Escorted by   Family Member    Service   [facility]    Admission type   Emergency              Arrival complaint   testicle pain             Chief Complaint   Patient presents with    Cyst     Presents to ER with cyst on left testicle, had one on right x1 month ago that was drained bedside. Was seen at [facility] today       Initial Presentation: 48 y.o. male with PMHx: DM who presented on 5/26 to [facility] ED due to worsening left testicular pain and swelling for 3 days. Patient was recently admitted in April for right-sided testicular pain and swelling seen by urology at that time who performed bedside I&D 4/26.  Patient was treated with Bactrim.  Wound cultures resulted as beta-hemolytic strep group B, Peptostreptococcus.  Patient was discharged on Bactrim for 6-day course.  Seen for follow-up appointment 5/10 with noted improvements of wound site and improved infection. Patient states significant pain on the left side of the testicle that is relieved by elevating his left leg/shifting in bed.  Significant pain to touch and  states no bruising or drainage.  Denies any recent trauma.  Denies any recent sexual activity.  Had subjective fevers and chills in the week leading up to hospitalization.  Currently complaining of 5-6 out of 10 pain at rest that worsens with touching testicles. In the ED, afebrile, , /87.  Pertinent labs glucose 177, WBC 10.10, ANC 7.63.  UA pending.  Blood cultures pending.  Patient given lidocaine cream, 1 L normal saline x 1, Bactrim in ED.  Plan:  Admit Inpatient status to med surg dt Scrotal abscess, DM: Urology consult, NPO after MN, continue ABX, pain control with scheduled and prn meds, fu on UA, trend BP, start accuchecks w/ SSI. PT OT bertha, SCDs.     5/26 Per Urology: US shows 5cm abscess Plan: N.p.o. at past midnight as the patient likely warrants I&D in the operating tomorrow. His poorly controlled diabetes is contributing to his recurrent abscesses    Date: 5/27   Day 2:  s/p I&D by Urology. Continue Bactrim for MRSA coverage due to prior culture and anaphylaxis to penicillin. Follow-up wound culture. Urology following, cleared for discharge. Start Lantus 6 units at bedtime. Plan for continuing insulin on discharge. Will need diabetic education, hypoglycemic education. Will try to get insurance authorization for insulin    5/27 OP Note:  Procedure(s):  Left - INCISION AND DRAINAGE LEFT SCROTUM   Anesthesia Type: General  Operative Findings:  Scrotal abscess at the left posterior aspect of the hemiscrotum almost in the perineum with gross pus evacuated upon opening the tissue  Abscess cavity measured approximately 5 cm x 4 cm x 3 cm  Cavity was thoroughly irrigated  Wound edges that appeared poorly viable were excised  Wound packed with 1 inch iodoform gauze    ED Triage Vitals   Temperature Pulse Respirations Blood Pressure SpO2   05/26/24 1408 05/26/24 1408 05/26/24 1408 05/26/24 1408 05/26/24 1408   97.8 °F (36.6 °C) (!) 106 20 (!) 203/87 98 %      Temp Source Heart Rate Source Patient  Position - Orthostatic VS BP Location FiO2 (%)   05/26/24 1408 05/26/24 1408 05/26/24 1408 05/26/24 1408 --   Oral Monitor Sitting Left arm       Pain Score       05/26/24 2331       7          Wt Readings from Last 1 Encounters:   05/26/24 72.6 kg (160 lb 0.9 oz)     Additional Vital Signs:   Date/Time Temp Pulse Resp BP MAP (mmHg) SpO2 O2 Flow Rate (L/min) O2 Device Cardiac (WDL) Patient Position - Orthostatic VS   05/28/24 0800 -- -- -- -- -- 97 % -- None (Room air) -- --   05/28/24 07:13:11 97.2 °F (36.2 °C) Abnormal  76 16 131/74 93 93 % -- None (Room air) -- Lying   05/27/24 21:54:30 99.7 °F (37.6 °C) 78 -- 123/65 84 91 % -- -- -- --   05/27/24 2037 -- -- -- -- -- 96 % -- None (Room air) -- --   05/27/24 15:29:18 97.9 °F (36.6 °C) 77 16 109/68 82 95 % -- None (Room air) -- Lying   05/27/24 1258 -- 80 -- 119/67 84 94 % -- -- -- --   05/27/24 1100 -- -- -- -- -- -- -- None (Room air) -- --   05/27/24 1046 -- 70 18 119/65 85 95 % -- None (Room air) -- --   05/27/24 1045 98 °F (36.7 °C) 70 14 119/65 85 95 % -- None (Room air) WDL --   05/27/24 1030 -- 76 14 106/60 73 95 % -- None (Room air) WDL --   05/27/24 1015 -- 78 18 102/60 75 95 % -- None (Room air) WDL --   05/27/24 1000 -- 64 16 87/53 Abnormal  65 99 % -- -- WDL --   05/27/24 0945 -- 68 18 90/53 64 Abnormal  99 % -- Simple mask WDL --   05/27/24 0931 99.4 °F (37.4 °C) -- 18 86/52 Abnormal  62 Abnormal  -- 6 L/min Simple mask WDL --   05/27/24 07:22:09 98.4 °F (36.9 °C) 89 18 142/74 97 93 % -- None (Room air) -- Lying   05/26/24 2200 -- -- -- -- -- 95 % -- None (Room air) -- --   05/26/24 21:32:19 98 °F (36.7 °C) 88 18 142/74 97 96 % -- None (Room air) -- Lying   05/26/24 2000 -- 94 16 146/73 96 96 % -- None (Room air) -- --   05/26/24 1600 -- 88 16 153/75 105 97 % -- None (Room air) -- Sitting   05/26/24 1544 -- -- -- -- -- -- -- None (Room air) -- --   05/26/24 1408 97.8 °F (36.6 °C) 106 Abnormal  20 203/87 Abnormal  119 98 % -- None (Room air) -- Sitting      Pertinent Labs/Diagnostic Test Results:   5/26 EKG: NSR    US scrotum and testicles   Final Result by Momo Vyas MD (05/26 1655)      Left-sided scrotal wall thickening and edema with 5 cm echogenic collection with peripheral vascularity suspicious for an abscess.         The study was marked in EPIC for immediate notification.      Workstation performed: [id number]         US bedside procedure   Final Result by Interface, Externalimages (05/26 4716)            Results from last 7 days   Lab Units 05/28/24 0445 05/27/24  0515 05/26/24  1540   WBC Thousand/uL 7.73 11.02* 10.10   HEMOGLOBIN g/dL 13.4 14.5 15.0   HEMATOCRIT % 41.1 42.6 43.7   PLATELETS Thousands/uL 193 210 207   TOTAL NEUT ABS Thousands/µL 4.66 8.20* 7.63*         Results from last 7 days   Lab Units 05/28/24  0445 05/27/24  0515 05/26/24  1540   SODIUM mmol/L 140 139 135   POTASSIUM mmol/L 4.0 3.4* 3.5   CHLORIDE mmol/L 102 103 101   CO2 mmol/L 29 24 25   ANION GAP mmol/L 9 12 9   BUN mg/dL 9 8 8   CREATININE mg/dL 0.62 0.59* 0.55*   EGFR ml/min/1.73sq m 116 118 122   CALCIUM mg/dL 8.7 8.6 8.8   MAGNESIUM mg/dL  --  1.9  --      Results from last 7 days   Lab Units 05/26/24  1540   AST U/L 15   ALT U/L 20   ALK PHOS U/L 92   TOTAL PROTEIN g/dL 6.9   ALBUMIN g/dL 3.9   TOTAL BILIRUBIN mg/dL 1.46*     Results from last 7 days   Lab Units 05/28/24  1144 05/28/24  0803 05/27/24  2225 05/27/24  2035 05/27/24  1706 05/27/24  1148 05/27/24  0937 05/27/24  0755 05/26/24  2227 05/26/24  1829   POC GLUCOSE mg/dl 144* 127 142* 204* 156* 111 130 137 210* 137     Results from last 7 days   Lab Units 05/28/24  0445 05/27/24  0515 05/26/24  1540   GLUCOSE RANDOM mg/dL 112 112 177*     Results from last 7 days   Lab Units 05/26/24  1540   PROTIME seconds 13.4   INR  1.03   PTT seconds 25         Results from last 7 days   Lab Units 05/26/24  1540   PROCALCITONIN ng/ml <0.05     Results from last 7 days   Lab Units 05/26/24  1540   LACTIC ACID mmol/L 1.0      Results from last 7 days   Lab Units 05/26/24  1833   CLARITY UA  Clear   COLOR UA  Light Yellow   SPEC GRAV UA  1.022   PH UA  5.5   GLUCOSE UA mg/dl >=1000 (1%)*   KETONES UA mg/dl 40 (2+)*   BLOOD UA  Negative   PROTEIN UA mg/dl Negative   NITRITE UA  Negative   BILIRUBIN UA  Negative   UROBILINOGEN UA (BE) mg/dl <2.0   LEUKOCYTES UA  Elevated glucose may cause decreased leukocyte values. See urine microscopic for UWBC result*   WBC UA /hpf 1-2   RBC UA /hpf 1-2   BACTERIA UA /hpf None Seen   EPITHELIAL CELLS WET PREP /hpf None Seen     Results from last 7 days   Lab Units 05/27/24  0909 05/26/24  1540   BLOOD CULTURE   --  No Growth at 24 hrs.  No Growth at 24 hrs.   GRAM STAIN RESULT  4+ Disintegrating polys*  4+ Gram positive cocci in pairs, chains and clusters*  2+ Gram variable rods*  --      ED Treatment:   Medication Administration from 05/26/2024 1402 to 05/26/2024 2124         Date/Time Order Dose Route Action     05/26/2024 1534 EDT lidocaine (LMX) 4 % cream -- Topical Given     05/26/2024 1535 EDT lidocaine (PF) (XYLOCAINE-MPF) 1 % injection 10 mL 10 mL Infiltration Given by Other     05/26/2024 1539 EDT sodium chloride 0.9 % bolus 1,000 mL 1,000 mL Intravenous New Bag     05/26/2024 1721 EDT sulfamethoxazole-trimethoprim (BACTRIM DS) 800-160 mg per tablet 1 tablet 1 tablet Oral Given     05/26/2024 1903 EDT acetaminophen (TYLENOL) tablet 975 mg 975 mg Oral Given          Past Medical History:   Diagnosis Date    Diabetes mellitus (HCC)     prediabetic     Psoriasis      Present on Admission:   Scrotal abscess   DM (diabetes mellitus) ([facility])      Admitting Diagnosis: Scrotal abscess [N49.2]  Benign cyst of both testicles [N44.2]  Age/Sex: 48 y.o. male  Admission Orders:  Scheduled Medications:  acetaminophen, 975 mg, Oral, Q8H AUSTYN  enoxaparin, 40 mg, Subcutaneous, Daily  insulin glargine, 6 Units, Subcutaneous, HS  insulin lispro, 1-5 Units, Subcutaneous, 4x Daily (AC &  HS)  sulfamethoxazole-trimethoprim, 2 tablet, Oral, Q12H AUSTYN      Continuous IV Infusions: none     PRN Meds:  HYDROmorphone, 0.2 mg, Intravenous, Q2H PRN  oxyCODONE, 2.5 mg, Oral, Q4H PRN x2   Or  oxyCODONE, 5 mg, Oral, Q4H PRN        IP CONSULT TO UROLOGY    Network Utilization Review Department  ATTENTION: Please call with any questions or concerns to [phone] and carefully listen to the prompts so that you are directed to the right person. All voicemails are confidential.   For Discharge needs, contact Care Management DC Support Team at [phone] opt. [phone]  Send all requests for admission clinical reviews, approved or denied determinations and any other requests to dedicated fax number below belonging to the campus where the patient is receiving treatment. List of dedicated fax numbers for the Facilities:  FACILITY NAME UR FAX NUMBER   ADMISSION DENIALS (Administrative/Medical Necessity) [phone]   DISCHARGE SUPPORT TEAM (NETWORK) [phone]   PARENT CHILD HEALTH (Maternity/NICU/Pediatrics) [phone]   [facility] [phone]   [facility] [phone]   [facility] [phone]   [facility] [phone]   [facility] [phone]   [facility] [phone]   [facility] [phone]   [facility] [phone]   [facility] [phone]   [facility] [phone]   [facility] [phone]   [facility] [phone]

## 2024-05-28 NOTE — PROGRESS NOTES
Progress Note - Urology  Demetri Miller 1975, 48 y.o. male MRN: [id number]    Unit/Bed#: [facility] Encounter: [id number]    * Scrotal abscess  Assessment & Plan  S/p I&D of left scrotal abscess measuring 5 cm x 4 cm x 3 cm on 5/27  Continue twice daily scrotal packing changes with 1 inch iodoform  Empiric broad-spectrum antibiotics tailored to wound culture  Preliminary wound culture: 4+ disintegrating polys, 4+ gram-positive cocci in pairs, chains and clusters, 2+ gram variable rods  WBC 7.73  Follow-up with urology outpatient  Discharge home per primary team when medically stable and on appropriate antibiotics            Subjective: 48-year-old male with history of recent scrotal abscess with I&D 4/26/2024.  Presented to the hospital with scrotal abscess at the left posterior aspect of the hemiscrotum almost in the perineum with gross pus evacuated upon opening the tissue.  Abscess cavity measured approximately 5 cm x 4 cm x 3 cm    24 HR EVENTS:   no significant events.      Patient has  complaints of burning sensation to scrotum.       Review of Systems   Constitutional:  Negative for activity change, appetite change, chills, fatigue, fever and unexpected weight change.   HENT:  Negative for facial swelling.    Eyes:  Negative for discharge.   Respiratory: Negative.  Negative for cough and shortness of breath.    Cardiovascular:  Negative for chest pain and leg swelling.   Gastrointestinal: Negative.  Negative for abdominal distention, abdominal pain, constipation, diarrhea, nausea and vomiting.   Endocrine: Negative.    Genitourinary: Negative.  Negative for decreased urine volume, difficulty urinating, dysuria, enuresis, flank pain, frequency, genital sores, hematuria and urgency.        Discomfort, burning sensation   Musculoskeletal:  Negative for back pain and myalgias.   Skin:  Negative for pallor and rash.   Allergic/Immunologic: Negative.  Negative for immunocompromised state.   Neurological:   "Negative for facial asymmetry and speech difficulty.   Psychiatric/Behavioral:  Negative for agitation and confusion.        Objective:    Vitals: Blood pressure 131/74, pulse 76, temperature (!) 97.2 °F (36.2 °C), temperature source Oral, resp. rate 16, height 5' 10\" (1.778 m), weight 72.6 kg (160 lb 0.9 oz), SpO2 93%.,Body mass index is 22.97 kg/m².  INS & OUTS:  I/O last 24 hours:  In: 1880 [P.O.:1480; I.V.:400]  Out: 0     Physical Exam  Vitals and nursing note reviewed.   Constitutional:       General: He is not in acute distress.     Appearance: Normal appearance. He is not ill-appearing, toxic-appearing or diaphoretic.   HENT:      Head: Normocephalic.   Cardiovascular:      Rate and Rhythm: Normal rate.   Pulmonary:      Effort: Pulmonary effort is normal. No respiratory distress.   Abdominal:      General: Abdomen is flat. There is no distension.   Genitourinary:     Comments: Scrotal packing in place with small amount of bloody drainage on old packing.  No subcu air palpated.  Musculoskeletal:         General: No swelling.      Cervical back: Normal range of motion.   Skin:     General: Skin is warm and dry.   Neurological:      General: No focal deficit present.      Mental Status: He is alert and oriented to person, place, and time.   Psychiatric:         Mood and Affect: Mood normal.         Behavior: Behavior normal.         Imaging:  SCROTAL ULTRASOUND     INDICATION: left scrotal abscess.     COMPARISON: Scrotal ultrasound 4/25/2024     TECHNIQUE: Ultrasound the scrotal contents was performed with a high frequency linear transducer utilizing volumetric sweep imaging as well as standard still image techniques. Imaging performed in longitudinal and transverse orientation. Color and   spectral Doppler evaluation also performed bilaterally.     FINDINGS:     TESTES:  Testes are symmetric and normal in size.     RIGHT testis = 4.0 x 1.7 x 2.8 cm. Volume 10.2 mL  Normal contour with homogeneous smooth " echotexture.  No intratesticular mass lesion or calcifications.     LEFT testis = 3.9 x 1.6 x 3.0 cm. Volume 9.7 mL  Normal contour with homogeneous smooth echotexture.  No intratesticular mass lesion or calcifications.     Doppler flow within both testes is present and symmetric.     EPIDIDYMIDES:  Normal size.  Doppler ultrasound demonstrates normal blood flow.  There are small epididymal cyst(s) in the left epididymis. Otherwise unremarkable.     HYDROCELE: No significant fluid present.     VARICOCELE: None present.     SCROTUM: Wall thickening of the left hemiscrotum. Soft tissues appear diffusely edematous as evidenced by thickening and increased echogenicity. Inferior to the left testicle in the left hemiscrotum is a 5.0 x 2.4 cm echogenic collection (image 90) with   mild peripheral vascularity, suspicious for an abscess.     IMPRESSION:     Left-sided scrotal wall thickening and edema with 5 cm echogenic collection with peripheral vascularity suspicious for an abscess.        The study was marked in EPIC for immediate notification.     Workstation performed: [id number]     Imaging reviewed - both report and images personally reviewed.     Labs:  Recent Labs     05/26/24  1540 05/27/24  0515 05/28/24  0445   WBC 10.10 11.02* 7.73       Recent Labs     05/26/24  1540 05/27/24  0515 05/28/24  0445   HGB 15.0 14.5 13.4     Recent Labs     05/26/24  1540 05/27/24  0515 05/28/24  0445   HCT 43.7 42.6 41.1     Recent Labs     05/26/24  1540 05/27/24  0515 05/28/24  0445   CREATININE 0.55* 0.59* 0.62     Lab Results   Component Value Date    HGB 13.4 05/28/2024    HCT 41.1 05/28/2024    WBC 7.73 05/28/2024     05/28/2024     Lab Results   Component Value Date    K 4.0 05/28/2024     05/28/2024    CO2 29 05/28/2024    BUN 9 05/28/2024    CREATININE 0.62 05/28/2024    CALCIUM 8.7 05/28/2024     GRAM STAIN RESULT  Abnormal   4+ Disintegrating polys      4+ Gram positive cocci in pairs, chains and clusters       2+ Gram variable rods        History:    Past Medical History:   Diagnosis Date    Diabetes mellitus (HCC)     prediabetic     Psoriasis      Past Surgical History:   Procedure Laterality Date    INCISION AND DRAINAGE  4/26/2024     Family History   Problem Relation Age of Onset    No Known Problems Father     No Known Problems Mother      Social History     Socioeconomic History    Marital status:      Spouse name: None    Number of children: None    Years of education: None    Highest education level: None   Occupational History    None   Tobacco Use    Smoking status: Every Day     Current packs/day: 0.50     Types: Cigarettes    Smokeless tobacco: Never   Vaping Use    Vaping status: Never Used   Substance and Sexual Activity    Alcohol use: Yes     Comment: rarely     Drug use: Never    Sexual activity: Yes   Other Topics Concern    None   Social History Narrative    Lives at home with family    Working full time     Social Determinants of Health     Financial Resource Strain: Not on file   Food Insecurity: No Food Insecurity (4/27/2024)    Hunger Vital Sign     Worried About Running Out of Food in the Last Year: Never true     Ran Out of Food in the Last Year: Never true   Transportation Needs: No Transportation Needs (4/27/2024)    PRAPARE - Transportation     Lack of Transportation (Medical): No     Lack of Transportation (Non-Medical): No   Physical Activity: Not on file   Stress: Not on file   Social Connections: Not on file   Intimate Partner Violence: Not on file   Housing Stability: Unknown (4/27/2024)    Housing Stability Vital Sign     Unable to Pay for Housing in the Last Year: No     Number of Places Lived in the Last Year: Not on file     Unstable Housing in the Last Year: No       The following portions of the patient's history were reviewed and updated as appropriate: allergies, current medications, past family history, past medical history, past social history, past surgical history  and problem list    RENITA Bonner  Date: 5/28/2024 Time: 10:57 AM

## 2024-05-28 NOTE — NURSING NOTE
Discharge instruction was reviewed with patient, diabetes and insulin education provided, patient verbalized understanding.

## 2024-05-29 LAB
BACTERIA SPEC ANAEROBE CULT: ABNORMAL
BACTERIA SPEC ANAEROBE CULT: ABNORMAL
BACTERIA WND AEROBE CULT: ABNORMAL
BACTERIA WND AEROBE CULT: ABNORMAL
GRAM STN SPEC: ABNORMAL

## 2024-05-31 LAB
BACTERIA BLD CULT: NORMAL
BACTERIA BLD CULT: NORMAL

## 2024-06-07 ENCOUNTER — OFFICE VISIT (OUTPATIENT)
Dept: FAMILY MEDICINE CLINIC | Facility: HOSPITAL | Age: 49
End: 2024-06-07
Payer: COMMERCIAL

## 2024-06-07 VITALS
SYSTOLIC BLOOD PRESSURE: 180 MMHG | BODY MASS INDEX: 23.34 KG/M2 | DIASTOLIC BLOOD PRESSURE: 102 MMHG | HEIGHT: 70 IN | HEART RATE: 68 BPM | OXYGEN SATURATION: 91 % | WEIGHT: 163 LBS

## 2024-06-07 DIAGNOSIS — E11.9 TYPE 2 DIABETES MELLITUS WITHOUT COMPLICATION, WITHOUT LONG-TERM CURRENT USE OF INSULIN (HCC): ICD-10-CM

## 2024-06-07 DIAGNOSIS — N49.2 SCROTAL ABSCESS: ICD-10-CM

## 2024-06-07 DIAGNOSIS — Z11.59 NEED FOR HEPATITIS C SCREENING TEST: Primary | ICD-10-CM

## 2024-06-07 DIAGNOSIS — R03.0 ELEVATED BP WITHOUT DIAGNOSIS OF HYPERTENSION: ICD-10-CM

## 2024-06-07 DIAGNOSIS — Z11.4 SCREENING FOR HIV (HUMAN IMMUNODEFICIENCY VIRUS): ICD-10-CM

## 2024-06-07 DIAGNOSIS — Z13.220 SCREENING FOR HYPERLIPIDEMIA: ICD-10-CM

## 2024-06-07 PROCEDURE — 99205 OFFICE O/P NEW HI 60 MIN: CPT | Performed by: STUDENT IN AN ORGANIZED HEALTH CARE EDUCATION/TRAINING PROGRAM

## 2024-06-07 RX ORDER — METFORMIN HYDROCHLORIDE 500 MG/1
500 TABLET, EXTENDED RELEASE ORAL 2 TIMES DAILY WITH MEALS
Qty: 180 TABLET | Refills: 1 | Status: SHIPPED | OUTPATIENT
Start: 2024-06-07

## 2024-06-07 RX ORDER — BLOOD SUGAR DIAGNOSTIC
STRIP MISCELLANEOUS
Qty: 400 EACH | Refills: 3 | Status: SHIPPED | OUTPATIENT
Start: 2024-06-07

## 2024-06-07 RX ORDER — SODIUM CHLORIDE 0.9 % (FLUSH) 0.9 %
SYRINGE (ML) INJECTION
Qty: 50 ML | Refills: 0 | Status: SHIPPED | OUTPATIENT
Start: 2024-06-07

## 2024-06-07 RX ORDER — LISINOPRIL 10 MG/1
10 TABLET ORAL DAILY
Qty: 30 TABLET | Refills: 1 | Status: SHIPPED | OUTPATIENT
Start: 2024-06-07

## 2024-06-07 RX ORDER — BLOOD-GLUCOSE METER
KIT MISCELLANEOUS
Qty: 1 KIT | Refills: 0 | Status: SHIPPED | OUTPATIENT
Start: 2024-06-07

## 2024-06-07 RX ORDER — LANCETS 33 GAUGE
EACH MISCELLANEOUS
Qty: 400 EACH | Refills: 3 | Status: SHIPPED | OUTPATIENT
Start: 2024-06-07

## 2024-06-07 NOTE — PATIENT INSTRUCTIONS

## 2024-06-07 NOTE — PROGRESS NOTES
[facility]   Nella Rao DO    Assessment/Plan:      Diagnosis ICD-10-CM Associated Orders   1. Need for hepatitis C screening test  Z11.59 Hepatitis C antibody      2. Scrotal abscess  N49.2 metFORMIN (GLUCOPHAGE-XR) 500 mg 24 hr tablet     sodium chloride 0.9 % SOLN      3. Screening for HIV (human immunodeficiency virus)  Z11.4 HIV 1/2 AG/AB W REFLEX [facility] and [facility] only      4. Screening for hyperlipidemia  Z13.220 Lipid Panel with Direct LDL reflex      5. Elevated BP without diagnosis of hypertension  R03.0 lisinopril (ZESTRIL) 10 mg tablet      6. Type 2 diabetes mellitus without complication, without long-term current use of insulin (HCC)  E11.9 Hemoglobin A1C     Basic metabolic panel     Albumin / creatinine urine ratio     Blood Glucose Monitoring Suppl (OneTouch Verio Reflect) w/Device KIT     glucose blood (OneTouch Verio) test strip     OneTouch Delica Lancets 33G MISC        Future labs given, Meds reviewed.   Start lisinopril 10 mg daily.   Pt will work on quitting smoking.   Call to make uro appt sooner.   Schedule with diabetic educator.   Declined insulin, is taking 1000 mg of metformin. Adding in more exercise. Eating more veggies & yogurts.   Return in about 7 weeks (around 7/29/2024) for F/U Chronic DX - DM2 .  Patient may call or return to office with any questions or concerns.   __________________________________________________________________  Subjective:     Patient ID: Demetri Miller is a 48 y.o. male.  HPI  Demetri Miller  Chief Complaint   Patient presents with   • Establish Care     [facility] DC  NEW DX DIABETES      Was with [facility], not going recently.   Hx of similar scrotal abscess in 2019.     Started in April - ED bedside I&D, then came back on other side in May.   Needed uro both times, ended up at [facility].     5/26/24 -   Patient presents with   • Cyst       Patient presents to the ER with a cyst on his testicle.      48-year-old male with a history of  right-sided scrotal abscess recently requiring I&D by urology presents for evaluation of left-sided scrotal swelling that started 5-6 days ago but states it got worse last night.  Associated pain and small amount of purulent drainage.  Patient reports feels similar to abscess on the right side    1.  Left scrotal abscess s/p I&D  2.  New onset diabetes mellitus  3.  Chronic smoker  4.  History of right scrotal abscess recently completed treatment with Bactrim     Follow-up urology outpatient  Discharge with Bactrim and cefuroxime  Pending Wound Cx - titrate antibiotic according to that after giving him a call  Start metformin 500 mg daily and titrated up to twice daily  Start Lantus 6 units at bedtime  Hypoglycemic education provided  Diabetic education provided  Highly encourage medication and insulin noncompliance  Technique to administer insulin was given  Pain management with Tylenol    New DX of DM2 w A1c 9.6. Borderline in the past.      Wt Readings from Last 3 Encounters:   06/07/24 73.9 kg (163 lb)   05/26/24 72.6 kg (160 lb 0.9 oz)   05/26/24 72.6 kg (160 lb)     Temp Readings from Last 3 Encounters:   05/28/24 (!) 97.2 °F (36.2 °C) (Oral)   05/26/24 99 °F (37.2 °C) (Oral)   04/27/24 98.3 °F (36.8 °C)     BP Readings from Last 3 Encounters:   06/07/24 (!) 180/102   05/28/24 131/74   05/26/24 (!) 193/96     Pulse Readings from Last 3 Encounters:   06/07/24 68   05/28/24 76   05/26/24 (!) 115     Depression Screening and Follow-up Plan: Patient was screened for depression during today's encounter. They screened negative with a PHQ-2 score of 2.    Tobacco Cessation Counseling: Tobacco cessation counseling was provided. The patient is sincerely urged to quit consumption of tobacco. He is not ready to quit tobacco.       The following portions of the patient's history were reviewed and updated as appropriate: allergies, current medications, past medical history, and problem list.    Review of Systems    Constitutional:  Negative for chills and fever.   Respiratory:  Negative for cough and shortness of breath.    Cardiovascular:  Negative for chest pain and palpitations.   Gastrointestinal:  Negative for constipation, diarrhea, nausea and vomiting.   Skin:         Psoriasis on elbows   Notes feet swell a ton at work.   Neurological:  Positive for headaches (possibly due to BP). Negative for dizziness and light-headedness.       Objective:      Vitals:    06/07/24 0914   BP: (!) 180/102   Pulse: 68   SpO2: 91%      Physical Exam  Vitals and nursing note reviewed. Exam conducted with a chaperone present (Fernanda MAST).   Constitutional:       General: He is not in acute distress.     Appearance: Normal appearance. He is normal weight. He is not ill-appearing.   HENT:      Head: Normocephalic and atraumatic.   Eyes:      General: No scleral icterus.        Right eye: No discharge.         Left eye: No discharge.   Cardiovascular:      Rate and Rhythm: Normal rate and regular rhythm.      Pulses: Normal pulses.      Heart sounds: Normal heart sounds. No murmur heard.  Pulmonary:      Effort: Pulmonary effort is normal. No respiratory distress.      Breath sounds: Normal breath sounds. No stridor. No wheezing.   Musculoskeletal:      Cervical back: Normal range of motion and neck supple. No rigidity.      Right lower leg: No edema.      Left lower leg: No edema.   Skin:     Findings: Lesion (small I&D wound left scrotum, not warm or red or tender, small packing without bleeding or purulent d/c) and rash (Psoriasis on elbows) present. No erythema.      Comments: Wound on L scrotum, about 1/4 in opening, small amount of iodine packing.    Neurological:      Mental Status: He is alert and oriented to person, place, and time.      Gait: Gait normal.   Psychiatric:         Mood and Affect: Mood normal.         Behavior: Behavior normal.         Thought Content: Thought content normal.         Judgment: Judgment normal.        "  Portions of the record may have been created with voice recognition software. Occasional wrong word or \"sound alike\" substitutions may have occurred due to the inherent limitations of voice recognition software. Please review the chart carefully and recognize, using context, where substitutions/typographical errors may have occurred.     "

## 2024-06-13 ENCOUNTER — TELEPHONE (OUTPATIENT)
Age: 49
End: 2024-06-13

## 2024-06-13 NOTE — TELEPHONE ENCOUNTER
Patient had called in stating that he saw Dr. Rao last week, and was prescribed lisinopril (ZESTRIL) 10 mg tablet     This medication had made the patient constipated and he was wondering if there is a different medication could be prescribed to him.     Please advise back to patient as soon as possible with any information.

## 2024-06-14 NOTE — TELEPHONE ENCOUNTER
PT call again about the medication hs is currently taking is getting the PT constipated. PT just liek to follow up with the message he relay yesterday due to not hearing back from one. PT would like to know if there is anything else that can replace the  lisinopril (ZESTRIL) 10 mg tablet. Thank you

## 2024-06-17 ENCOUNTER — APPOINTMENT (OUTPATIENT)
Dept: LAB | Facility: HOSPITAL | Age: 49
End: 2024-06-17
Payer: COMMERCIAL

## 2024-06-17 ENCOUNTER — OFFICE VISIT (OUTPATIENT)
Dept: FAMILY MEDICINE CLINIC | Facility: HOSPITAL | Age: 49
End: 2024-06-17
Payer: COMMERCIAL

## 2024-06-17 VITALS
TEMPERATURE: 98.1 F | DIASTOLIC BLOOD PRESSURE: 70 MMHG | SYSTOLIC BLOOD PRESSURE: 130 MMHG | OXYGEN SATURATION: 97 % | WEIGHT: 158 LBS | HEIGHT: 70 IN | RESPIRATION RATE: 16 BRPM | HEART RATE: 98 BPM | BODY MASS INDEX: 22.62 KG/M2

## 2024-06-17 DIAGNOSIS — Z13.220 SCREENING FOR HYPERLIPIDEMIA: ICD-10-CM

## 2024-06-17 DIAGNOSIS — Z11.4 SCREENING FOR HIV (HUMAN IMMUNODEFICIENCY VIRUS): ICD-10-CM

## 2024-06-17 DIAGNOSIS — Z11.59 NEED FOR HEPATITIS C SCREENING TEST: ICD-10-CM

## 2024-06-17 DIAGNOSIS — K59.00 CONSTIPATION, UNSPECIFIED CONSTIPATION TYPE: ICD-10-CM

## 2024-06-17 DIAGNOSIS — R10.32 LEFT LOWER QUADRANT ABDOMINAL PAIN: Primary | ICD-10-CM

## 2024-06-17 DIAGNOSIS — E11.9 TYPE 2 DIABETES MELLITUS WITHOUT COMPLICATION, WITHOUT LONG-TERM CURRENT USE OF INSULIN (HCC): ICD-10-CM

## 2024-06-17 DIAGNOSIS — R10.32 LEFT LOWER QUADRANT ABDOMINAL PAIN: ICD-10-CM

## 2024-06-17 PROBLEM — R03.0 ELEVATED BP WITHOUT DIAGNOSIS OF HYPERTENSION: Status: RESOLVED | Noted: 2024-05-26 | Resolved: 2024-06-17

## 2024-06-17 LAB
ALBUMIN SERPL BCP-MCNC: 4.2 G/DL (ref 3.5–5)
ALP SERPL-CCNC: 88 U/L (ref 34–104)
ALT SERPL W P-5'-P-CCNC: 31 U/L (ref 7–52)
ANION GAP SERPL CALCULATED.3IONS-SCNC: 10 MMOL/L (ref 4–13)
AST SERPL W P-5'-P-CCNC: 18 U/L (ref 13–39)
BASOPHILS # BLD AUTO: 0.02 THOUSANDS/ÂΜL (ref 0–0.1)
BASOPHILS NFR BLD AUTO: 0 % (ref 0–1)
BILIRUB SERPL-MCNC: 1.1 MG/DL (ref 0.2–1)
BUN SERPL-MCNC: 12 MG/DL (ref 5–25)
CALCIUM SERPL-MCNC: 9.6 MG/DL (ref 8.4–10.2)
CHLORIDE SERPL-SCNC: 97 MMOL/L (ref 96–108)
CHOLEST SERPL-MCNC: 139 MG/DL
CO2 SERPL-SCNC: 29 MMOL/L (ref 21–32)
CREAT SERPL-MCNC: 0.73 MG/DL (ref 0.6–1.3)
CREAT UR-MCNC: 115.8 MG/DL
CRP SERPL QL: 169.2 MG/L
EOSINOPHIL # BLD AUTO: 0.04 THOUSAND/ÂΜL (ref 0–0.61)
EOSINOPHIL NFR BLD AUTO: 1 % (ref 0–6)
ERYTHROCYTE [DISTWIDTH] IN BLOOD BY AUTOMATED COUNT: 11.9 % (ref 11.6–15.1)
ERYTHROCYTE [SEDIMENTATION RATE] IN BLOOD: 52 MM/HOUR (ref 0–14)
EST. AVERAGE GLUCOSE BLD GHB EST-MCNC: 212 MG/DL
GFR SERPL CREATININE-BSD FRML MDRD: 108 ML/MIN/1.73SQ M
GLUCOSE P FAST SERPL-MCNC: 123 MG/DL (ref 65–99)
HBA1C MFR BLD: 9 %
HCT VFR BLD AUTO: 42.8 % (ref 36.5–49.3)
HDLC SERPL-MCNC: 40 MG/DL
HGB BLD-MCNC: 14 G/DL (ref 12–17)
IMM GRANULOCYTES # BLD AUTO: 0.03 THOUSAND/UL (ref 0–0.2)
IMM GRANULOCYTES NFR BLD AUTO: 0 % (ref 0–2)
LDLC SERPL CALC-MCNC: 76 MG/DL (ref 0–100)
LYMPHOCYTES # BLD AUTO: 2.05 THOUSANDS/ÂΜL (ref 0.6–4.47)
LYMPHOCYTES NFR BLD AUTO: 26 % (ref 14–44)
MCH RBC QN AUTO: 28.9 PG (ref 26.8–34.3)
MCHC RBC AUTO-ENTMCNC: 32.7 G/DL (ref 31.4–37.4)
MCV RBC AUTO: 88 FL (ref 82–98)
MICROALBUMIN UR-MCNC: 20.8 MG/L
MICROALBUMIN/CREAT 24H UR: 18 MG/G CREATININE (ref 0–30)
MONOCYTES # BLD AUTO: 0.77 THOUSAND/ÂΜL (ref 0.17–1.22)
MONOCYTES NFR BLD AUTO: 10 % (ref 4–12)
NEUTROPHILS # BLD AUTO: 4.98 THOUSANDS/ÂΜL (ref 1.85–7.62)
NEUTS SEG NFR BLD AUTO: 63 % (ref 43–75)
NRBC BLD AUTO-RTO: 0 /100 WBCS
PLATELET # BLD AUTO: 245 THOUSANDS/UL (ref 149–390)
PMV BLD AUTO: 10 FL (ref 8.9–12.7)
POTASSIUM SERPL-SCNC: 4.6 MMOL/L (ref 3.5–5.3)
PROT SERPL-MCNC: 7.5 G/DL (ref 6.4–8.4)
RBC # BLD AUTO: 4.85 MILLION/UL (ref 3.88–5.62)
SODIUM SERPL-SCNC: 136 MMOL/L (ref 135–147)
T4 FREE SERPL-MCNC: 1.29 NG/DL (ref 0.61–1.12)
TRIGL SERPL-MCNC: 114 MG/DL
TSH SERPL DL<=0.05 MIU/L-ACNC: 0.3 UIU/ML (ref 0.45–4.5)
WBC # BLD AUTO: 7.89 THOUSAND/UL (ref 4.31–10.16)

## 2024-06-17 PROCEDURE — 82043 UR ALBUMIN QUANTITATIVE: CPT

## 2024-06-17 PROCEDURE — 85652 RBC SED RATE AUTOMATED: CPT

## 2024-06-17 PROCEDURE — 82570 ASSAY OF URINE CREATININE: CPT

## 2024-06-17 PROCEDURE — 83036 HEMOGLOBIN GLYCOSYLATED A1C: CPT

## 2024-06-17 PROCEDURE — 85025 COMPLETE CBC W/AUTO DIFF WBC: CPT

## 2024-06-17 PROCEDURE — 84439 ASSAY OF FREE THYROXINE: CPT

## 2024-06-17 PROCEDURE — 86140 C-REACTIVE PROTEIN: CPT

## 2024-06-17 PROCEDURE — 80061 LIPID PANEL: CPT

## 2024-06-17 PROCEDURE — 84443 ASSAY THYROID STIM HORMONE: CPT

## 2024-06-17 PROCEDURE — 87389 HIV-1 AG W/HIV-1&-2 AB AG IA: CPT

## 2024-06-17 PROCEDURE — 86803 HEPATITIS C AB TEST: CPT

## 2024-06-17 PROCEDURE — 36415 COLL VENOUS BLD VENIPUNCTURE: CPT

## 2024-06-17 PROCEDURE — 99214 OFFICE O/P EST MOD 30 MIN: CPT | Performed by: INTERNAL MEDICINE

## 2024-06-17 PROCEDURE — 80053 COMPREHEN METABOLIC PANEL: CPT

## 2024-06-17 NOTE — ASSESSMENT & PLAN NOTE
Patient presents with his mother for evaluation of left lower quadrant abdominal pain and constipation.    Patient ate taco last week on Tuesday in the evening.  Then last week on Wednesday he developed constipation and a left lower quadrant abdominal pain.  He had 1 episode of vomiting Wednesday.  He was not able to move his bowels last week.  He had decreased appetite.  Then over the weekend he took Dulcolax, Colace and magnesium citrate and he had plenty of bowel movements since last night with significant improvement in the left lower quadrant abdominal is covered by today.     Denies any signs of bleeding.  Denies fevers, chills.  Urination has been normal at this time    Thinking that the severe constipation was caused by medications patient has not taken his metformin or lisinopril.    He is concerned whether he could have had food poisoning and wonders if the scrotal abscess has anything to do with his constipation.    Patient's membranes are dry today  His abdomen is nondistended, skin shows no rash, bruising.  Bowel sounds are normal, it is completely normal without any tenderness, rebound or guarding including in the left lower quadrant.  He had no CVA tenderness either.    I told patient that his constipation and left lower quadrant abdominal pain are not associated with history of scrotal abscess and I doubt that does not feel metformin would cause them.  I also doubt food poisoning.    Suspect his left lower quad abdominal pain was due to constipation.  Diverticulitis is less likely based on examination today.    I ordered CBC with differential, CRP, ESR to look for signs of infection, anemia, inflammation.  His abdominal examination is completely normal today.  There is no indication for imaging today.    Consider CT abdomen depending on lab results    I asked the patient to work light duty for 2 days until he fully recovers.  He works in a warehouse.

## 2024-06-17 NOTE — ASSESSMENT & PLAN NOTE
Lab Results   Component Value Date    HGBA1C 9.6 (H) 04/25/2024     Patient has diabetes mellitus.  I doubt that metformin causes constipation.    I also told patient that I doubt that lisinopril causes his constipation.    His blood pressure is normal without taking lisinopril.  I asked him to stop taking lisinopril.    I asked him to follow-up with Dr. Rao as scheduled on August 2 as long as he remains like how he is today

## 2024-06-17 NOTE — PROGRESS NOTES
Assessment/Plan:    Left lower quadrant abdominal pain  Patient presents with his mother for evaluation of left lower quadrant abdominal pain and constipation.    Patient ate taco last week on Tuesday in the evening.  Then last week on Wednesday he developed constipation and a left lower quadrant abdominal pain.  He had 1 episode of vomiting Wednesday.  He was not able to move his bowels last week.  He had decreased appetite.  Then over the weekend he took Dulcolax, Colace and magnesium citrate and he had plenty of bowel movements since last night with significant improvement in the left lower quadrant abdominal is covered by today.     Denies any signs of bleeding.  Denies fevers, chills.  Urination has been normal at this time    Thinking that the severe constipation was caused by medications patient has not taken his metformin or lisinopril.    He is concerned whether he could have had food poisoning and wonders if the scrotal abscess has anything to do with his constipation.    Patient's membranes are dry today  His abdomen is nondistended, skin shows no rash, bruising.  Bowel sounds are normal, it is completely normal without any tenderness, rebound or guarding including in the left lower quadrant.  He had no CVA tenderness either.    I told patient that his constipation and left lower quadrant abdominal pain are not associated with history of scrotal abscess and I doubt that does not feel metformin would cause them.  I also doubt food poisoning.    Suspect his left lower quad abdominal pain was due to constipation.  Diverticulitis is less likely based on examination today.    I ordered CBC with differential, CRP, ESR to look for signs of infection, anemia, inflammation.  His abdominal examination is completely normal today.  There is no indication for imaging today.    Consider CT abdomen depending on lab results    I asked the patient to work light duty for 2 days until he fully recovers.  He works in a  "[employer].    Type 2 diabetes mellitus without complication, without long-term current use of insulin ([facility])    Lab Results   Component Value Date    HGBA1C 9.6 (H) 04/25/2024     Patient has diabetes mellitus.  I doubt that metformin causes constipation.    I also told patient that I doubt that lisinopril causes his constipation.    His blood pressure is normal without taking lisinopril.  I asked him to stop taking lisinopril.    I asked him to follow-up with Dr. Rao as scheduled on August 2 as long as he remains like how he is today       Diagnoses and all orders for this visit:    Left lower quadrant abdominal pain  -     CBC and differential; Future  -     Comprehensive metabolic panel; Future  -     Sedimentation rate, automated; Future  -     C-reactive protein; Future    Constipation, unspecified constipation type  -     TSH, 3rd generation with Free T4 reflex; Future    Type 2 diabetes mellitus without complication, without long-term current use of insulin ([facility])          Subjective:      Patient ID: Demetri Miller is a 48 y.o. male.      HPI    Patient presents for follow-up of chronic conditions as detailed in the assessment and plan.      The following portions of the patient's history were reviewed and updated as appropriate: current medications, past family history, past medical history, past social history, past surgical history and problem list.    Review of Systems      Objective:    /70 (BP Location: Left arm, Patient Position: Sitting)   Pulse 98   Temp 98.1 °F (36.7 °C) (Tympanic)   Resp 16   Ht 5' 10\" (1.778 m)   Wt 71.7 kg (158 lb)   SpO2 97%   BMI 22.67 kg/m²      Physical Exam  Constitutional:       General: He is not in acute distress.     Appearance: He is not toxic-appearing.   HENT:      Head: Normocephalic.      Mouth/Throat:      Mouth: Mucous membranes are dry.   Cardiovascular:      Rate and Rhythm: Normal rate and regular rhythm.      Heart sounds: No murmur " heard.  Pulmonary:      Effort: No respiratory distress.      Breath sounds: No wheezing or rales.   Abdominal:      General: Bowel sounds are normal. There is no distension.      Palpations: Abdomen is soft. There is no mass.      Tenderness: There is no abdominal tenderness. There is no right CVA tenderness, left CVA tenderness or guarding.   Skin:     General: Skin is warm and dry.      Findings: No bruising, erythema or rash.   Neurological:      Mental Status: He is alert.             Vicente Sousa MD

## 2024-06-17 NOTE — LETTER
June 17, 2024     Patient: Demetri Miller  YOB: 1975  Date of Visit: 6/17/2024      To Whom it May Concern:    Demetri Miller is under my professional care. Demetri was seen in my office on 6/17/2024. I advised Demetri to work light duty on 6/18/24 and 6/19/2024.    If you have any questions or concerns, please don't hesitate to call.         Sincerely,          Vicente Sousa MD        CC: No Recipients

## 2024-06-18 DIAGNOSIS — R10.32 LEFT LOWER QUADRANT ABDOMINAL PAIN: Primary | ICD-10-CM

## 2024-06-18 DIAGNOSIS — R79.89 LOW TSH LEVEL: ICD-10-CM

## 2024-06-18 LAB — HIV 1+2 AB+HIV1 P24 AG SERPL QL IA: NON REACTIVE

## 2024-06-19 ENCOUNTER — TELEPHONE (OUTPATIENT)
Age: 49
End: 2024-06-19

## 2024-06-19 DIAGNOSIS — R10.32 LEFT LOWER QUADRANT ABDOMINAL PAIN: Primary | ICD-10-CM

## 2024-06-19 LAB — HCV AB SER QL: NORMAL

## 2024-06-19 NOTE — TELEPHONE ENCOUNTER
Spoke with pts mom and reviewed labs, she states pts pain level is back to a 6, please advise     Please call mom  [phone]

## 2024-06-19 NOTE — TELEPHONE ENCOUNTER
Patient's mother Alaina had called in requesting to have blood work results run through with her. Patient was unsure what the doctor was saying yesterday 06/18/2024 when he had reached out to discuss the blood work results, and wanted the mother to call in.     Alaina is on the medical communication form that has been signed and placed in patient's chart.     Clinical team was unavailable at the time of assistance to run through lab results.     Please advise back to patient's mother to run through results.

## 2024-06-19 NOTE — TELEPHONE ENCOUNTER
Patients mother is calling to follow up on this request for a call with blood work test results, she states her son is at work in pain, warm transfer to clinical staff for further assistance

## 2024-06-20 ENCOUNTER — APPOINTMENT (EMERGENCY)
Dept: CT IMAGING | Facility: HOSPITAL | Age: 49
End: 2024-06-20
Payer: COMMERCIAL

## 2024-06-20 ENCOUNTER — HOSPITAL ENCOUNTER (EMERGENCY)
Facility: HOSPITAL | Age: 49
Discharge: HOME | End: 2024-06-20
Attending: EMERGENCY MEDICINE
Payer: COMMERCIAL

## 2024-06-20 VITALS
DIASTOLIC BLOOD PRESSURE: 80 MMHG | RESPIRATION RATE: 16 BRPM | TEMPERATURE: 98.2 F | SYSTOLIC BLOOD PRESSURE: 160 MMHG | HEART RATE: 93 BPM | OXYGEN SATURATION: 96 %

## 2024-06-20 DIAGNOSIS — N20.1 URETEROLITHIASIS: Primary | ICD-10-CM

## 2024-06-20 DIAGNOSIS — R10.32 LEFT LOWER QUADRANT ABDOMINAL PAIN: Primary | ICD-10-CM

## 2024-06-20 LAB
ALBUMIN SERPL BCG-MCNC: 4.2 G/DL (ref 3.5–5)
ALP SERPL-CCNC: 92 U/L (ref 34–104)
ALT SERPL W P-5'-P-CCNC: 26 U/L (ref 7–52)
ANION GAP SERPL CALCULATED.3IONS-SCNC: 11 MMOL/L (ref 4–13)
AST SERPL W P-5'-P-CCNC: 14 U/L (ref 13–39)
BACTERIA UR QL AUTO: ABNORMAL /HPF
BASOPHILS # BLD AUTO: 0.02 THOUSANDS/ÂΜL (ref 0–0.1)
BASOPHILS NFR BLD AUTO: 0 % (ref 0–1)
BILIRUB SERPL-MCNC: 1.12 MG/DL (ref 0.2–1)
BILIRUB UR QL STRIP: NEGATIVE
BUN SERPL-MCNC: 11 MG/DL (ref 5–25)
CALCIUM SERPL-MCNC: 9.5 MG/DL (ref 8.4–10.2)
CHLORIDE SERPL-SCNC: 97 MMOL/L (ref 96–108)
CLARITY UR: CLEAR
CO2 SERPL-SCNC: 26 MMOL/L (ref 21–32)
COLOR UR: YELLOW
CREAT SERPL-MCNC: 0.94 MG/DL (ref 0.6–1.3)
EOSINOPHIL # BLD AUTO: 0.04 THOUSAND/ÂΜL (ref 0–0.61)
EOSINOPHIL NFR BLD AUTO: 0 % (ref 0–6)
ERYTHROCYTE [DISTWIDTH] IN BLOOD BY AUTOMATED COUNT: 11.8 % (ref 11.6–15.1)
GFR SERPL CREATININE-BSD FRML MDRD: 94 ML/MIN/1.73SQ M
GLUCOSE SERPL-MCNC: 156 MG/DL (ref 65–140)
GLUCOSE UR STRIP-MCNC: ABNORMAL MG/DL
HCT VFR BLD AUTO: 43.4 % (ref 36.5–49.3)
HGB BLD-MCNC: 14.3 G/DL (ref 12–17)
HGB UR QL STRIP.AUTO: ABNORMAL
IMM GRANULOCYTES # BLD AUTO: 0.07 THOUSAND/UL (ref 0–0.2)
IMM GRANULOCYTES NFR BLD AUTO: 1 % (ref 0–2)
KETONES UR STRIP-MCNC: ABNORMAL MG/DL
LEUKOCYTE ESTERASE UR QL STRIP: ABNORMAL
LIPASE SERPL-CCNC: 7 U/L (ref 11–82)
LYMPHOCYTES # BLD AUTO: 1.4 THOUSANDS/ÂΜL (ref 0.6–4.47)
LYMPHOCYTES NFR BLD AUTO: 14 % (ref 14–44)
MCH RBC QN AUTO: 28.8 PG (ref 26.8–34.3)
MCHC RBC AUTO-ENTMCNC: 32.9 G/DL (ref 31.4–37.4)
MCV RBC AUTO: 87 FL (ref 82–98)
MONOCYTES # BLD AUTO: 0.77 THOUSAND/ÂΜL (ref 0.17–1.22)
MONOCYTES NFR BLD AUTO: 8 % (ref 4–12)
NEUTROPHILS # BLD AUTO: 7.99 THOUSANDS/ÂΜL (ref 1.85–7.62)
NEUTS SEG NFR BLD AUTO: 77 % (ref 43–75)
NITRITE UR QL STRIP: NEGATIVE
NON-SQ EPI CELLS URNS QL MICRO: ABNORMAL /HPF
NRBC BLD AUTO-RTO: 0 /100 WBCS
PH UR STRIP.AUTO: 5.5 [PH]
PLATELET # BLD AUTO: 244 THOUSANDS/UL (ref 149–390)
PMV BLD AUTO: 9 FL (ref 8.9–12.7)
POTASSIUM SERPL-SCNC: 4.2 MMOL/L (ref 3.5–5.3)
PROT SERPL-MCNC: 7.7 G/DL (ref 6.4–8.4)
PROT UR STRIP-MCNC: NEGATIVE MG/DL
RBC # BLD AUTO: 4.97 MILLION/UL (ref 3.88–5.62)
RBC #/AREA URNS AUTO: ABNORMAL /HPF
SODIUM SERPL-SCNC: 134 MMOL/L (ref 135–147)
SP GR UR STRIP.AUTO: 1.01 (ref 1–1.03)
UROBILINOGEN UR STRIP-ACNC: <2 MG/DL
WBC # BLD AUTO: 10.29 THOUSAND/UL (ref 4.31–10.16)
WBC #/AREA URNS AUTO: ABNORMAL /HPF

## 2024-06-20 PROCEDURE — 99283 EMERGENCY DEPT VISIT LOW MDM: CPT

## 2024-06-20 PROCEDURE — 96360 HYDRATION IV INFUSION INIT: CPT

## 2024-06-20 PROCEDURE — 85025 COMPLETE CBC W/AUTO DIFF WBC: CPT | Performed by: EMERGENCY MEDICINE

## 2024-06-20 PROCEDURE — 83690 ASSAY OF LIPASE: CPT | Performed by: EMERGENCY MEDICINE

## 2024-06-20 PROCEDURE — 74177 CT ABD & PELVIS W/CONTRAST: CPT

## 2024-06-20 PROCEDURE — 81001 URINALYSIS AUTO W/SCOPE: CPT | Performed by: EMERGENCY MEDICINE

## 2024-06-20 PROCEDURE — 99285 EMERGENCY DEPT VISIT HI MDM: CPT | Performed by: EMERGENCY MEDICINE

## 2024-06-20 PROCEDURE — 80053 COMPREHEN METABOLIC PANEL: CPT | Performed by: EMERGENCY MEDICINE

## 2024-06-20 PROCEDURE — 96361 HYDRATE IV INFUSION ADD-ON: CPT

## 2024-06-20 PROCEDURE — 36415 COLL VENOUS BLD VENIPUNCTURE: CPT | Performed by: EMERGENCY MEDICINE

## 2024-06-20 RX ORDER — ONDANSETRON 4 MG/1
4 TABLET, ORALLY DISINTEGRATING ORAL EVERY 6 HOURS PRN
Qty: 10 TABLET | Refills: 0 | Status: SHIPPED | OUTPATIENT
Start: 2024-06-20

## 2024-06-20 RX ORDER — OXYCODONE HYDROCHLORIDE 5 MG/1
5 TABLET ORAL EVERY 6 HOURS PRN
Qty: 8 TABLET | Refills: 0 | Status: SHIPPED | OUTPATIENT
Start: 2024-06-20 | End: 2024-06-30

## 2024-06-20 RX ORDER — ONDANSETRON 4 MG/1
4 TABLET, FILM COATED ORAL EVERY 8 HOURS PRN
COMMUNITY

## 2024-06-20 RX ORDER — TAMSULOSIN HYDROCHLORIDE 0.4 MG/1
0.4 CAPSULE ORAL
Qty: 10 CAPSULE | Refills: 0 | Status: SHIPPED | OUTPATIENT
Start: 2024-06-20

## 2024-06-20 RX ADMIN — SODIUM CHLORIDE 1000 ML: 0.9 INJECTION, SOLUTION INTRAVENOUS at 11:36

## 2024-06-20 RX ADMIN — IOHEXOL 100 ML: 350 INJECTION, SOLUTION INTRAVENOUS at 12:45

## 2024-06-20 NOTE — TELEPHONE ENCOUNTER
Patient mom called the office and stated that patient went to emergency department and they admitted him. States he didn't need to go to emergency department and they are not paying for an emergency department visit.

## 2024-06-20 NOTE — ED NOTES
"Patient's mother called unit stating \"my son is there under strict orders from a doctor for a cat scan, why is is he STILL in the er?\"     Patient's mother educated on medical information being given out over the phone, patient informed, and portable phone provided to patient.      Amanda Michaud RN  06/20/24 2142    "

## 2024-06-20 NOTE — ED PROVIDER NOTES
History  Chief Complaint   Patient presents with    Constipation     Pt states he has had abdominal pain for a week. He has had trouble making bowel movements. Has not been eating much due to the abdominal pain.      Patient is a 48-year-old male with recent admission for scrotal abscess that presents for evaluation of left-sided abdominal pain.  Patient says over the past week he has been some having some intermittent left lower quadrant abdominal pain.  Seems to be related to his bowels and when he is able to move his bowels/pass gas he feels better.  No urinary complaints, hematuria or dysuria.  No fevers chills or rigors.  He was on antibiotics up to about 2 weeks ago.  Denies chest pain dyspnea.  No diarrhea or blood in the stool noted.  Scrotal abscess is healed and he has no complaints of testicular swelling or pain.        Prior to Admission Medications   Prescriptions Last Dose Informant Patient Reported? Taking?   Blood Glucose Monitoring Suppl (OneTouch Verio Reflect) w/Device KIT   No No   Sig: Check blood sugars four times daily. Please substitute with appropriate alternative as covered by patient's insurance. Dx: E11.65   OneTouch Delica Lancets 33G MISC   No No   Sig: Check blood sugars four times daily. Please substitute with appropriate alternative as covered by patient's insurance. Dx: E11.65   glucose blood (OneTouch Verio) test strip   No No   Sig: Check blood sugars four times daily. Please substitute with appropriate alternative as covered by patient's insurance. Dx: E11.65   metFORMIN (GLUCOPHAGE-XR) 500 mg 24 hr tablet   No No   Sig: Take 1 tablet (500 mg total) by mouth 2 (two) times a day with meals   Patient not taking: Reported on 6/17/2024   ondansetron (ZOFRAN) 4 mg tablet   Yes Yes   Sig: Take 4 mg by mouth every 8 (eight) hours as needed for nausea or vomiting   sodium chloride 0.9 % SOLN   No No   Sig: Use to clean wound site      Facility-Administered Medications: None       Past  Medical History:   Diagnosis Date    Diabetes mellitus (HCC)     prediabetic     Psoriasis        Past Surgical History:   Procedure Laterality Date    INCISION AND DRAINAGE  4/26/2024    INCISION AND DRAINAGE OF WOUND Left 5/27/2024    Procedure: INCISION AND DRAINAGE LEFT SCROTUM;  Surgeon: Yung Maria MD;  Location: [facility];  Service: Urology       Family History   Problem Relation Age of Onset    No Known Problems Father     No Known Problems Mother      I have reviewed and agree with the history as documented.    E-Cigarette/Vaping    E-Cigarette Use Never User      E-Cigarette/Vaping Substances     Social History     Tobacco Use    Smoking status: Every Day     Current packs/day: 0.50     Types: Cigarettes    Smokeless tobacco: Never   Vaping Use    Vaping status: Never Used   Substance Use Topics    Alcohol use: Yes     Comment: rarely     Drug use: Never       Review of Systems   Constitutional:  Negative for fever.   HENT:  Negative for sore throat.    Eyes:  Negative for photophobia.   Respiratory:  Negative for shortness of breath.    Cardiovascular:  Negative for chest pain.   Gastrointestinal:  Positive for abdominal pain.   Genitourinary:  Negative for dysuria.   Musculoskeletal:  Negative for back pain.   Skin:  Negative for rash.   Neurological:  Negative for light-headedness.   Hematological:  Negative for adenopathy.   Psychiatric/Behavioral:  Negative for agitation.    All other systems reviewed and are negative.      Physical Exam  Physical Exam  Vitals reviewed.   Constitutional:       General: He is not in acute distress.     Appearance: He is well-developed.   HENT:      Head: Normocephalic.   Eyes:      Pupils: Pupils are equal, round, and reactive to light.   Cardiovascular:      Rate and Rhythm: Normal rate and regular rhythm.      Heart sounds: Normal heart sounds. No murmur heard.     No friction rub. No gallop.   Pulmonary:      Effort: Pulmonary effort is normal.      Breath  sounds: Normal breath sounds.   Abdominal:      General: Bowel sounds are normal. There is no distension.      Palpations: Abdomen is soft.      Tenderness: There is no abdominal tenderness. There is no guarding.   Musculoskeletal:         General: Normal range of motion.      Cervical back: Normal range of motion and neck supple.   Skin:     Capillary Refill: Capillary refill takes less than 2 seconds.   Neurological:      Mental Status: He is alert and oriented to person, place, and time.      Cranial Nerves: No cranial nerve deficit.      Sensory: No sensory deficit.      Motor: No abnormal muscle tone.   Psychiatric:         Behavior: Behavior normal.         Thought Content: Thought content normal.         Judgment: Judgment normal.         Vital Signs  ED Triage Vitals   Temperature Pulse Respirations Blood Pressure SpO2   06/20/24 1102 06/20/24 1102 06/20/24 1102 06/20/24 1103 06/20/24 1103   98.2 °F (36.8 °C) 104 18 (!) 176/95 98 %      Temp Source Heart Rate Source Patient Position - Orthostatic VS BP Location FiO2 (%)   06/20/24 1102 06/20/24 1102 06/20/24 1200 06/20/24 1200 --   Oral Monitor Lying Left arm       Pain Score       --                  Vitals:    06/20/24 1102 06/20/24 1103 06/20/24 1200 06/20/24 1230   BP:  (!) 176/95 156/76 160/80   Pulse: 104 104 93 93   Patient Position - Orthostatic VS:   Lying Lying         Visual Acuity      ED Medications  Medications   sodium chloride 0.9 % bolus 1,000 mL (0 mL Intravenous Stopped 6/20/24 1348)   iohexol (OMNIPAQUE) 350 MG/ML injection (MULTI-DOSE) 100 mL (100 mL Intravenous Given 6/20/24 1245)       Diagnostic Studies  Results Reviewed       Procedure Component Value Units Date/Time    Urine Microscopic [[id number]]  (Abnormal) Collected: 06/20/24 1257    Lab Status: Final result Specimen: Urine, Clean Catch Updated: 06/20/24 1326     RBC, UA None Seen /hpf      WBC, UA 4-10 /hpf      Epithelial Cells None Seen /hpf      Bacteria, UA Occasional /hpf      UA w Reflex to Microscopic w Reflex to Culture [[id number]]  (Abnormal) Collected: 06/20/24 1257    Lab Status: Final result Specimen: Urine, Clean Catch Updated: 06/20/24 1325     Color, UA Yellow     Clarity, UA Clear     Specific Gravity, UA 1.015     pH, UA 5.5     Leukocytes, UA Small     Nitrite, UA Negative     Protein, UA Negative mg/dl      Glucose,  (3/10%) mg/dl      Ketones, UA 40 (2+) mg/dl      Urobilinogen, UA <2.0 mg/dl      Bilirubin, UA Negative     Occult Blood, UA Moderate    CMP [[id number]]  (Abnormal) Collected: 06/20/24 1137    Lab Status: Final result Specimen: Blood from Arm, Right Updated: 06/20/24 1204     Sodium 134 mmol/L      Potassium 4.2 mmol/L      Chloride 97 mmol/L      CO2 26 mmol/L      ANION GAP 11 mmol/L      BUN 11 mg/dL      Creatinine 0.94 mg/dL      Glucose 156 mg/dL      Calcium 9.5 mg/dL      AST 14 U/L      ALT 26 U/L      Alkaline Phosphatase 92 U/L      Total Protein 7.7 g/dL      Albumin 4.2 g/dL      Total Bilirubin 1.12 mg/dL      eGFR 94 ml/min/1.73sq m     Narrative:      National Kidney Disease Foundation guidelines for Chronic Kidney Disease (CKD):     Stage 1 with normal or high GFR (GFR > 90 mL/min/1.73 square meters)    Stage 2 Mild CKD (GFR = 60-89 mL/min/1.73 square meters)    Stage 3A Moderate CKD (GFR = 45-59 mL/min/1.73 square meters)    Stage 3B Moderate CKD (GFR = 30-44 mL/min/1.73 square meters)    Stage 4 Severe CKD (GFR = 15-29 mL/min/1.73 square meters)    Stage 5 End Stage CKD (GFR <15 mL/min/1.73 square meters)  Note: GFR calculation is accurate only with a steady state creatinine    Lipase [[id number]]  (Abnormal) Collected: 06/20/24 1137    Lab Status: Final result Specimen: Blood from Arm, Right Updated: 06/20/24 1204     Lipase 7 u/L     CBC and differential [[id number]]  (Abnormal) Collected: 06/20/24 1137    Lab Status: Final result Specimen: Blood from Arm, Right Updated: 06/20/24 1143     WBC 10.29 Thousand/uL      RBC 4.97  Million/uL      Hemoglobin 14.3 g/dL      Hematocrit 43.4 %      MCV 87 fL      MCH 28.8 pg      MCHC 32.9 g/dL      RDW 11.8 %      MPV 9.0 fL      Platelets 244 Thousands/uL      nRBC 0 /100 WBCs      Segmented % 77 %      Immature Grans % 1 %      Lymphocytes % 14 %      Monocytes % 8 %      Eosinophils Relative 0 %      Basophils Relative 0 %      Absolute Neutrophils 7.99 Thousands/µL      Absolute Immature Grans 0.07 Thousand/uL      Absolute Lymphocytes 1.40 Thousands/µL      Absolute Monocytes 0.77 Thousand/µL      Eosinophils Absolute 0.04 Thousand/µL      Basophils Absolute 0.02 Thousands/µL                    CT abdomen pelvis with contrast   Final Result by Viral Kyees MD (06/20 1323)      Mild left hydroureteronephrosis with urothelial inflammation secondary to a 5 mm calculus at the UVJ.      The study was marked in EPIC for immediate notification.            Workstation performed: [id number]                    Procedures  Procedures         ED Course                               SBIRT 20yo+      Flowsheet Row Most Recent Value   Initial Alcohol Screen: US AUDIT-C     1. How often do you have a drink containing alcohol? 0 Filed at: 06/20/2024 1103   2. How many drinks containing alcohol do you have on a typical day you are drinking?  0 Filed at: 06/20/2024 1103   3a. Male UNDER 65: How often do you have five or more drinks on one occasion? 0 Filed at: 06/20/2024 1103   3b. FEMALE Any Age, or MALE 65+: How often do you have 4 or more drinks on one occassion? 0 Filed at: 06/20/2024 1103   Audit-C Score 0 Filed at: 06/20/2024 1103   MARICRUZ: How many times in the past year have you...    Used an illegal drug or used a prescription medication for non-medical reasons? Never Filed at: 06/20/2024 1103                      Medical Decision Making  Patient is a 48-year-old male who presents for evaluation of abdominal pain.  Found to have left-sided ureterolithiasis.  No evidence of UTI or renal  dysfunction.  Pain controlled with no pain medicine at this time.  Patient was given urology follow-up and advised on using a urine strainer.  Also prescribed oxycodone, Flomax, Zofran for symptom management related to ureterolithiasis.  Discussed strict return precautions.    Amount and/or Complexity of Data Reviewed  Labs: ordered.  Radiology: ordered.    Risk  Prescription drug management.             Disposition  Final diagnoses:   Ureterolithiasis     Time reflects when diagnosis was documented in both MDM as applicable and the Disposition within this note       Time User Action Codes Description Comment    6/20/2024  1:40 PM Sher Leroy Add [N20.1] Ureterolithiasis           ED Disposition       ED Disposition   Discharge    Condition   Stable    Date/Time   Thu Jun 20, 2024 1340    Comment   Demetri Miller discharge to home/self care.                   Follow-up Information       Follow up With Specialties Details Why Contact Info Additional Information     [facility] Emergency Medicine  If symptoms worsen [address] [facility] [address] [phone] [facility], [address] [place], [place] [address]    [facility] Schedule an appointment as soon as possible for a visit   [facility] [address] [id number] [phone] [facility], [address] [place] [address], [place] [address], [place], [place], [address]   [phone]            Discharge Medication List as of 6/20/2024  1:42 PM        START taking these medications    Details   ondansetron (Zofran ODT) 4 mg disintegrating tablet Take 1 tablet (4 mg total) by mouth every 6 (six) hours as needed for nausea or vomiting, Starting Thu 6/20/2024, Normal      oxyCODONE (Roxicodone) 5 immediate release tablet Take 1 tablet (5 mg total) by mouth every 6 (six)  hours as needed for severe pain for up to 10 days Max Daily Amount: 20 mg, Starting Thu 6/20/2024, Until Sun 6/30/2024 at 2359, Normal      tamsulosin (FLOMAX) 0.4 mg Take 1 capsule (0.4 mg total) by mouth daily with dinner, Starting Thu 6/20/2024, Normal           CONTINUE these medications which have NOT CHANGED    Details   ondansetron (ZOFRAN) 4 mg tablet Take 4 mg by mouth every 8 (eight) hours as needed for nausea or vomiting, Historical Med      Blood Glucose Monitoring Suppl (OneTouch Verio Reflect) w/Device KIT Check blood sugars four times daily. Please substitute with appropriate alternative as covered by patient's insurance. Dx: E11.65, Normal      glucose blood (OneTouch Verio) test strip Check blood sugars four times daily. Please substitute with appropriate alternative as covered by patient's insurance. Dx: E11.65, Normal      metFORMIN (GLUCOPHAGE-XR) 500 mg 24 hr tablet Take 1 tablet (500 mg total) by mouth 2 (two) times a day with meals, Starting Fri 6/7/2024, Normal      OneTouch Delica Lancets 33G MISC Check blood sugars four times daily. Please substitute with appropriate alternative as covered by patient's insurance. Dx: E11.65, Normal      sodium chloride 0.9 % SOLN Use to clean wound site, Normal                 PDMP Review       None            ED Provider  Electronically Signed by             Sher Leroy MD  06/20/24 2530

## 2024-06-21 ENCOUNTER — TELEPHONE (OUTPATIENT)
Age: 49
End: 2024-06-21

## 2024-06-21 NOTE — TELEPHONE ENCOUNTER
Tried to call patient to offer Urg slot 7/8 at 1pm, pt's voicemail is not set up so could not leave a message. Will try patient again later.

## 2024-06-21 NOTE — TELEPHONE ENCOUNTER
Patient recently underwent Incision and drainage of left scrotum with Dr. Maria on 5/27/24 and has a follow up with Tona in [place] on 8/15.    Patient being referred back to urology ASAP. Patient went to the ER yesterday for abdominal pain and they found a 5 mm kidney stone on CT.    IMPRESSION:     Mild left hydroureteronephrosis with urothelial inflammation secondary to a 5 mm calculus at the UVJ.    Please advise if patient should follow up sooner than his 8/15 appt    Patient can be reached at [phone]

## 2024-06-24 NOTE — TELEPHONE ENCOUNTER
Tried to call pt again today to offer appointment with Rula on 7/8 at 1p. No answer and VM still not set up. Will try again at another time.

## 2024-06-27 NOTE — TELEPHONE ENCOUNTER
Called the patient and left a VM requesting a call back at [phone] for a possible sooner appointment, [phone].    Attempt to Reach Letter Sent    Patient is currently scheduled:    Date: 8/15/2024 Status: [facility]   Time: 9:40 AM Length: 20   Visit Type: FOLLOW UP [facility] [[id number]] Copay: $15.00   Provider: RENITA Cramer Department: [facility]

## 2024-09-05 ENCOUNTER — TELEPHONE (OUTPATIENT)
Dept: FAMILY MEDICINE CLINIC | Facility: HOSPITAL | Age: 49
End: 2024-09-05

## 2024-09-05 NOTE — TELEPHONE ENCOUNTER
Left message for him to call back/ Not sure if he will get the message as his communication consent so he does not have voice mail ( but the call les to ) I will try calling again tomorrow if we do not hear back from him     See attached note

## 2024-09-05 NOTE — TELEPHONE ENCOUNTER
----- Message from Nella Rao DO sent at 9/5/2024  2:46 PM EDT -----  Please call and ask him to schedule a visit with us.  His A1c over the past several months was quite elevated, and if he comes in after September 17 we can recheck his A1c in office.  It is much too high for his age.  Has been lost any weight?  I know he was planning to increase his metformin.     His cholesterol panel has been normal as well as HIV and hep C.

## 2024-09-11 DIAGNOSIS — N49.2 SCROTAL ABSCESS: ICD-10-CM

## 2024-09-11 RX ORDER — METFORMIN HCL 500 MG
500 TABLET, EXTENDED RELEASE 24 HR ORAL 2 TIMES DAILY WITH MEALS
Qty: 180 TABLET | Refills: 1 | Status: SHIPPED | OUTPATIENT
Start: 2024-09-11

## 2024-10-18 ENCOUNTER — OFFICE VISIT (OUTPATIENT)
Dept: FAMILY MEDICINE CLINIC | Facility: HOSPITAL | Age: 49
End: 2024-10-18
Payer: COMMERCIAL

## 2024-10-18 ENCOUNTER — TELEPHONE (OUTPATIENT)
Age: 49
End: 2024-10-18

## 2024-10-18 VITALS
HEART RATE: 120 BPM | TEMPERATURE: 98.7 F | SYSTOLIC BLOOD PRESSURE: 160 MMHG | HEIGHT: 70 IN | BODY MASS INDEX: 24.31 KG/M2 | WEIGHT: 169.8 LBS | DIASTOLIC BLOOD PRESSURE: 90 MMHG | OXYGEN SATURATION: 98 %

## 2024-10-18 DIAGNOSIS — E11.9 TYPE 2 DIABETES MELLITUS WITHOUT COMPLICATION, WITHOUT LONG-TERM CURRENT USE OF INSULIN (HCC): ICD-10-CM

## 2024-10-18 DIAGNOSIS — L02.224 BOIL OF GROIN: ICD-10-CM

## 2024-10-18 DIAGNOSIS — Z59.86 FINANCIAL INSECURITY: ICD-10-CM

## 2024-10-18 DIAGNOSIS — N49.2 SCROTAL ABSCESS: Primary | ICD-10-CM

## 2024-10-18 LAB — SL AMB POCT HEMOGLOBIN AIC: 8.3 (ref ?–6.5)

## 2024-10-18 PROCEDURE — 83036 HEMOGLOBIN GLYCOSYLATED A1C: CPT

## 2024-10-18 PROCEDURE — 99214 OFFICE O/P EST MOD 30 MIN: CPT

## 2024-10-18 RX ORDER — METFORMIN HYDROCHLORIDE 500 MG/1
1000 TABLET, EXTENDED RELEASE ORAL 2 TIMES DAILY WITH MEALS
Qty: 180 TABLET | Refills: 1 | Status: SHIPPED | OUTPATIENT
Start: 2024-10-18

## 2024-10-18 RX ORDER — GLIMEPIRIDE 2 MG/1
2 TABLET ORAL
Qty: 90 TABLET | Refills: 0 | Status: SHIPPED | OUTPATIENT
Start: 2024-10-18 | End: 2025-04-16

## 2024-10-18 SDOH — ECONOMIC STABILITY - INCOME SECURITY: FINANCIAL INSECURITY: Z59.86

## 2024-10-18 NOTE — TELEPHONE ENCOUNTER
Patient would like if the doctor can call in a script for antibiotics for a insist he had and it drain out and has no fever and it healing well just would like to take an antibiotics for it. Thank you

## 2024-10-18 NOTE — ASSESSMENT & PLAN NOTE
Lab Results   Component Value Date    HGBA1C 8.3 (A) 10/18/2024     Not controlled, counseled on importance of blood sugar control to prevent recurrent infections.  Increasing metformin from 500 to 1000 mg twice daily.  Counseled on diet and exercise.  Starting Amaryl for additional glycemic control, counseled on risk of hypoglycemia, hypoglycemia handout reviewed and provided.  Refraining from other first-line diabetes medication options including GLP-1 as he is not overweight and SGLT2 as this could further increase his risk of a scrotal infection.  Return to office for annual for further monitoring glycemic control.    Orders:    POCT hemoglobin A1c    Ambulatory Referral to Diabetic Education; Future    glimepiride (AMARYL) 2 mg tablet; Take 1 tablet (2 mg total) by mouth daily with breakfast

## 2024-10-18 NOTE — PROGRESS NOTES
Ambulatory Visit  Name: Demetri Miller      : 1975      MRN: [id number]  Encounter Provider: Saulo Nguyen MD  Encounter Date: 10/18/2024   Encounter department: [facility] [address]    Assessment & Plan  Scrotal abscess  Skin thickening and scarring with 2 sinus tracts noted on perineal area posterior to scrotum, without warmth, tenderness or discharge.  No concern for active infection at this time.  Counseled on signs of infection that would warrant immediate medical attention.  Lost to urology follow-up during previous infections and complications.  Advised to proactively establish with urology and consideration of surgical removal and management plan for possible recurrences.  Hidradenitis suppurativa handout reviewed and provided    Orders:    metFORMIN (GLUCOPHAGE-XR) 500 mg 24 hr tablet; Take 2 tablets (1,000 mg total) by mouth 2 (two) times a day with meals    Type 2 diabetes mellitus without complication, without long-term current use of insulin ([facility])    Lab Results   Component Value Date    HGBA1C 8.3 (A) 10/18/2024     Not controlled, counseled on importance of blood sugar control to prevent recurrent infections.  Increasing metformin from 500 to 1000 mg twice daily.  Counseled on diet and exercise.  Starting Amaryl for additional glycemic control, counseled on risk of hypoglycemia, hypoglycemia handout reviewed and provided.  Refraining from other first-line diabetes medication options including GLP-1 as he is not overweight and SGLT2 as this could further increase his risk of a scrotal infection.  Return to office for annual for further monitoring glycemic control.    Orders:    POCT hemoglobin A1c    Ambulatory Referral to Diabetic Education; Future    glimepiride (AMARYL) 2 mg tablet; Take 1 tablet (2 mg total) by mouth daily with breakfast    Financial insecurity    Orders:    Ambulatory Referral to Social Work Care Management Program; Future    Boil of  "groin    Orders:    Ambulatory Referral to Urology; Future       History of Present Illness     HPI  Patient with a known history of a scrotal abscess presents with concern for bleeding from a scrotal cyst a few weeks ago which is since resolved.  He denies any fever or chills.  He denies any bleeding or discharge from his genital area at this time.      Review of Systems   Constitutional:  Negative for chills and fever.   Genitourinary:  Negative for penile discharge, penile pain, scrotal swelling and testicular pain.           Objective     /90   Pulse (!) 120   Temp 98.7 °F (37.1 °C)   Ht 5' 10\" (1.778 m)   Wt 77 kg (169 lb 12.8 oz)   SpO2 98%   BMI 24.36 kg/m²     Physical Exam  Constitutional:       General: He is not in acute distress.     Appearance: Normal appearance.   HENT:      Head: Normocephalic and atraumatic.   Genitourinary:     Comments: Skin thickening and scarred with 2 sinus tracts noted on perineal area posterior to scrotum, without warmth, tenderness or discharge.  Neurological:      Mental Status: He is alert and oriented to person, place, and time.   Psychiatric:         Mood and Affect: Mood normal.         Behavior: Behavior normal.         "

## 2024-10-18 NOTE — ASSESSMENT & PLAN NOTE
Skin thickening and scarring with 2 sinus tracts noted on perineal area posterior to scrotum, without warmth, tenderness or discharge.  No concern for active infection at this time.  Counseled on signs of infection that would warrant immediate medical attention.  Lost to urology follow-up during previous infections and complications.  Advised to proactively establish with urology and consideration of surgical removal and management plan for possible recurrences.  Hidradenitis suppurativa handout reviewed and provided    Orders:    metFORMIN (GLUCOPHAGE-XR) 500 mg 24 hr tablet; Take 2 tablets (1,000 mg total) by mouth 2 (two) times a day with meals

## 2024-10-21 ENCOUNTER — PATIENT OUTREACH (OUTPATIENT)
Dept: CASE MANAGEMENT | Facility: OTHER | Age: 49
End: 2024-10-21

## 2024-10-21 NOTE — PROGRESS NOTES
GALILEA CM received referral to contact patient to offer support regarding financial concerns.  Call to patient X2 however message states he is not available.  Will continue attempts to contact patient to offer support.

## 2024-10-25 ENCOUNTER — PATIENT OUTREACH (OUTPATIENT)
Dept: CASE MANAGEMENT | Facility: OTHER | Age: 49
End: 2024-10-25

## 2024-10-25 NOTE — PROGRESS NOTES
GALILEA FERRER placed second call to patient today however message continues to state that patient is not available.  Unable to Reach letter will be mailed to patient and Unable to Reach email has been sent to patient today.  Will close referral and remain available to provide support.

## (undated) DEVICE — PLUMEPEN PRO 10FT

## (undated) DEVICE — INTENDED FOR TISSUE SEPARATION, AND OTHER PROCEDURES THAT REQUIRE A SHARP SURGICAL BLADE TO PUNCTURE OR CUT.: Brand: BARD-PARKER SAFETY BLADES SIZE 15, STERILE

## (undated) DEVICE — GLOVE SRG BIOGEL ECLIPSE 7.5

## (undated) DEVICE — IODOFORM PACKING STRIP: Brand: CURITY

## (undated) DEVICE — ROSEBUD DISSECTORS: Brand: DEROYAL

## (undated) DEVICE — BETHLEHEM UNIVERSAL MINOR GEN: Brand: CARDINAL HEALTH

## (undated) DEVICE — CULTURE TUBE ANAEROBIC

## (undated) DEVICE — SPONGE STICK WITH PVP-I: Brand: KENDALL

## (undated) DEVICE — SCD SEQUENTIAL COMPRESSION COMFORT SLEEVE MEDIUM KNEE LENGTH: Brand: KENDALL SCD

## (undated) DEVICE — CULTURE TUBE AEROBIC

## (undated) DEVICE — NEEDLE 25G X 1 1/2

## (undated) DEVICE — POOLE SUCTION HANDLE W/TUBING: Brand: CARDINAL HEALTH

## (undated) DEVICE — GAUZE SPONGES,16 PLY: Brand: CURITY

## (undated) DEVICE — SYRINGE 10ML LL

## (undated) DEVICE — GLOVE INDICATOR PI UNDERGLOVE SZ 8 BLUE